# Patient Record
Sex: FEMALE | Race: WHITE | Employment: OTHER | URBAN - METROPOLITAN AREA
[De-identification: names, ages, dates, MRNs, and addresses within clinical notes are randomized per-mention and may not be internally consistent; named-entity substitution may affect disease eponyms.]

---

## 2017-01-12 PROCEDURE — 88305 TISSUE EXAM BY PATHOLOGIST: CPT | Performed by: RADIOLOGY

## 2017-01-16 PROCEDURE — 88305 TISSUE EXAM BY PATHOLOGIST: CPT | Performed by: RADIOLOGY

## 2017-02-07 PROBLEM — D04.30 SQUAMOUS CELL CARCINOMA IN SITU OF SKIN OF FACE: Status: ACTIVE | Noted: 2017-02-07

## 2017-02-07 PROBLEM — N60.99 DUCTAL HYPERPLASIA OF BREAST: Status: ACTIVE | Noted: 2017-02-07

## 2017-05-02 PROBLEM — I67.2 ATHEROSCLEROTIC CEREBROVASCULAR DISEASE: Status: ACTIVE | Noted: 2017-05-02

## 2017-05-02 PROBLEM — M47.816 FACET ARTHRITIS OF LUMBAR REGION: Status: ACTIVE | Noted: 2017-05-02

## 2017-05-02 PROBLEM — E65: Status: ACTIVE | Noted: 2017-05-02

## 2017-05-02 PROBLEM — D04.30 SQUAMOUS CELL CARCINOMA IN SITU OF SKIN OF FACE: Status: RESOLVED | Noted: 2017-02-07 | Resolved: 2017-05-02

## 2017-05-16 PROBLEM — M76.891 HIP TENDONITIS, RIGHT: Status: ACTIVE | Noted: 2017-05-16

## 2017-07-20 PROCEDURE — 81003 URINALYSIS AUTO W/O SCOPE: CPT | Performed by: INTERNAL MEDICINE

## 2017-10-02 PROBLEM — I49.3 FREQUENT PVCS: Status: ACTIVE | Noted: 2017-10-02

## 2017-10-02 PROBLEM — I47.1 PAROXYSMAL SVT (SUPRAVENTRICULAR TACHYCARDIA) (HCC): Status: ACTIVE | Noted: 2017-10-02

## 2017-10-02 PROBLEM — I47.10 PAROXYSMAL SVT (SUPRAVENTRICULAR TACHYCARDIA) (HCC): Status: ACTIVE | Noted: 2017-10-02

## 2017-10-02 PROBLEM — I49.1 PAC (PREMATURE ATRIAL CONTRACTION): Status: ACTIVE | Noted: 2017-10-02

## 2017-10-13 PROBLEM — H81.12 BENIGN PAROXYSMAL VERTIGO OF LEFT EAR: Status: ACTIVE | Noted: 2017-10-13

## 2017-10-31 PROBLEM — I50.32 CHRONIC DIASTOLIC HEART FAILURE (HCC): Status: ACTIVE | Noted: 2017-10-31

## 2018-02-06 PROBLEM — R73.01 IFG (IMPAIRED FASTING GLUCOSE): Status: ACTIVE | Noted: 2018-02-06

## 2018-02-06 PROBLEM — M76.891 HIP TENDONITIS, RIGHT: Status: RESOLVED | Noted: 2017-05-16 | Resolved: 2018-02-06

## 2018-02-06 PROBLEM — R73.01 IFG (IMPAIRED FASTING GLUCOSE): Status: RESOLVED | Noted: 2018-02-06 | Resolved: 2018-02-06

## 2018-02-06 PROBLEM — E65: Status: RESOLVED | Noted: 2017-05-02 | Resolved: 2018-02-06

## 2018-02-06 PROCEDURE — 81003 URINALYSIS AUTO W/O SCOPE: CPT | Performed by: INTERNAL MEDICINE

## 2018-02-06 PROCEDURE — 87086 URINE CULTURE/COLONY COUNT: CPT | Performed by: INTERNAL MEDICINE

## 2018-02-07 PROBLEM — H81.12 BENIGN PAROXYSMAL VERTIGO OF LEFT EAR: Status: RESOLVED | Noted: 2017-10-13 | Resolved: 2018-02-07

## 2018-04-23 PROCEDURE — 81003 URINALYSIS AUTO W/O SCOPE: CPT | Performed by: UROLOGY

## 2018-05-24 PROBLEM — M47.816 FACET ARTHRITIS OF LUMBAR REGION: Status: RESOLVED | Noted: 2017-05-02 | Resolved: 2018-05-24

## 2018-09-24 PROBLEM — F33.0 DEPRESSION, MAJOR, RECURRENT, MILD: Status: ACTIVE | Noted: 2018-09-24

## 2018-09-24 PROBLEM — F33.0 DEPRESSION, MAJOR, RECURRENT, MILD (HCC): Status: ACTIVE | Noted: 2018-09-24

## 2018-10-10 PROCEDURE — 88305 TISSUE EXAM BY PATHOLOGIST: CPT | Performed by: RADIOLOGY

## 2018-10-10 PROCEDURE — 88341 IMHCHEM/IMCYTCHM EA ADD ANTB: CPT | Performed by: RADIOLOGY

## 2018-10-10 PROCEDURE — 88342 IMHCHEM/IMCYTCHM 1ST ANTB: CPT | Performed by: RADIOLOGY

## 2018-11-14 PROBLEM — H43.811 VITREOUS DETACHMENT, RIGHT: Status: ACTIVE | Noted: 2018-11-14

## 2018-11-27 PROCEDURE — 81001 URINALYSIS AUTO W/SCOPE: CPT | Performed by: INTERNAL MEDICINE

## 2018-11-27 PROCEDURE — 87086 URINE CULTURE/COLONY COUNT: CPT | Performed by: INTERNAL MEDICINE

## 2019-03-20 PROBLEM — E65: Status: RESOLVED | Noted: 2017-05-02 | Resolved: 2019-03-20

## 2019-03-20 PROBLEM — I70.0 ATHEROSCLEROSIS OF AORTIC BIFURCATION AND COMMON ILIAC ARTERIES (HCC): Status: ACTIVE | Noted: 2019-03-20

## 2019-03-20 PROBLEM — I70.8 ATHEROSCLEROSIS OF AORTIC BIFURCATION AND COMMON ILIAC ARTERIES (HCC): Status: ACTIVE | Noted: 2019-03-20

## 2019-03-20 PROBLEM — I70.0 ATHEROSCLEROSIS OF AORTIC BIFURCATION AND COMMON ILIAC ARTERIES: Status: ACTIVE | Noted: 2019-03-20

## 2019-03-20 PROBLEM — I70.8 ATHEROSCLEROSIS OF AORTIC BIFURCATION AND COMMON ILIAC ARTERIES: Status: ACTIVE | Noted: 2019-03-20

## 2019-11-30 PROBLEM — I70.0 AORTO-ILIAC ATHEROSCLEROSIS (HCC): Status: ACTIVE | Noted: 2019-11-30

## 2019-11-30 PROBLEM — I77.819 AORTIC ECTASIA (HCC): Status: ACTIVE | Noted: 2019-11-30

## 2019-11-30 PROBLEM — I70.8 AORTO-ILIAC ATHEROSCLEROSIS (HCC): Status: ACTIVE | Noted: 2019-11-30

## 2019-11-30 PROBLEM — I70.0 AORTO-ILIAC ATHEROSCLEROSIS: Status: ACTIVE | Noted: 2019-11-30

## 2019-11-30 PROBLEM — I70.8 AORTO-ILIAC ATHEROSCLEROSIS: Status: ACTIVE | Noted: 2019-11-30

## 2019-11-30 PROBLEM — I77.819 AORTIC ECTASIA: Status: ACTIVE | Noted: 2019-11-30

## 2019-12-02 PROBLEM — M12.811 ROTATOR CUFF ARTHROPATHY OF RIGHT SHOULDER: Status: ACTIVE | Noted: 2019-12-02

## 2020-04-02 PROBLEM — I70.0 AORTO-ILIAC ATHEROSCLEROSIS (HCC): Status: RESOLVED | Noted: 2019-11-30 | Resolved: 2020-04-02

## 2020-04-02 PROBLEM — I70.0 AORTO-ILIAC ATHEROSCLEROSIS: Status: RESOLVED | Noted: 2019-11-30 | Resolved: 2020-04-02

## 2020-04-02 PROBLEM — I70.8 AORTO-ILIAC ATHEROSCLEROSIS: Status: RESOLVED | Noted: 2019-11-30 | Resolved: 2020-04-02

## 2020-04-02 PROBLEM — I70.8 AORTO-ILIAC ATHEROSCLEROSIS (HCC): Status: RESOLVED | Noted: 2019-11-30 | Resolved: 2020-04-02

## 2020-05-07 PROBLEM — I73.9 SMALL VESSEL DISEASE: Status: ACTIVE | Noted: 2017-05-02

## 2020-05-07 PROBLEM — R26.89 BALANCE DISORDER: Status: ACTIVE | Noted: 2020-05-07

## 2020-05-07 PROBLEM — I73.9 SMALL VESSEL DISEASE (HCC): Status: ACTIVE | Noted: 2017-05-02

## 2020-05-07 PROBLEM — K21.9 GASTROESOPHAGEAL REFLUX DISEASE WITHOUT ESOPHAGITIS: Status: ACTIVE | Noted: 2020-05-07

## 2020-07-22 PROBLEM — I73.9 PVD (PERIPHERAL VASCULAR DISEASE) (HCC): Status: ACTIVE | Noted: 2020-07-22

## 2020-07-22 PROBLEM — I73.9 PVD (PERIPHERAL VASCULAR DISEASE): Status: ACTIVE | Noted: 2020-07-22

## 2020-08-20 PROBLEM — I67.89 CEREBRAL MICROVASCULAR DISEASE: Status: ACTIVE | Noted: 2020-08-20

## 2020-08-25 PROBLEM — I63.81 LEFT SIDED LACUNAR INFARCTION (HCC): Status: ACTIVE | Noted: 2020-08-25

## 2020-08-25 PROBLEM — I73.9 PAD (PERIPHERAL ARTERY DISEASE): Status: ACTIVE | Noted: 2020-08-25

## 2020-08-25 PROBLEM — I73.9 PAD (PERIPHERAL ARTERY DISEASE) (HCC): Status: ACTIVE | Noted: 2020-08-25

## 2020-08-25 PROBLEM — M17.11 PRIMARY OSTEOARTHRITIS OF RIGHT KNEE: Status: ACTIVE | Noted: 2020-08-25

## 2020-08-25 PROBLEM — I83.93 VARICOSE VEINS OF BOTH LOWER EXTREMITIES, UNSPECIFIED WHETHER COMPLICATED: Status: ACTIVE | Noted: 2020-08-25

## 2020-11-19 PROBLEM — I63.81 LEFT SIDED LACUNAR INFARCTION (HCC): Status: RESOLVED | Noted: 2020-08-25 | Resolved: 2020-11-19

## 2020-11-19 PROBLEM — C76.1 PRIMARY SQUAMOUS CELL CARCINOMA OF CHEST WALL (HCC): Status: ACTIVE | Noted: 2020-11-19

## 2020-11-19 PROBLEM — Z86.73 HISTORY OF STROKE: Status: ACTIVE | Noted: 2020-11-19

## 2021-01-21 PROCEDURE — 88304 TISSUE EXAM BY PATHOLOGIST: CPT | Performed by: SURGERY

## 2021-02-24 PROBLEM — M25.551 RIGHT HIP PAIN: Status: ACTIVE | Noted: 2021-02-24

## 2021-03-23 PROBLEM — Q21.1 PFO (PATENT FORAMEN OVALE): Status: ACTIVE | Noted: 2021-03-23

## 2021-03-23 PROBLEM — Q21.12 PFO (PATENT FORAMEN OVALE) (HCC): Status: ACTIVE | Noted: 2021-03-23

## 2021-03-29 PROBLEM — R07.9 RIGHT-SIDED CHEST PAIN: Status: ACTIVE | Noted: 2021-01-23

## 2021-03-29 PROBLEM — J93.9 PNEUMOTHORAX, RIGHT: Status: ACTIVE | Noted: 2021-01-24

## 2021-03-30 ENCOUNTER — LAB ENCOUNTER (OUTPATIENT)
Dept: LAB | Age: 81
End: 2021-03-30
Attending: ORTHOPAEDIC SURGERY
Payer: MEDICARE

## 2021-03-30 DIAGNOSIS — Z01.818 PREOPERATIVE TESTING: ICD-10-CM

## 2021-03-30 PROCEDURE — 86901 BLOOD TYPING SEROLOGIC RH(D): CPT

## 2021-03-30 PROCEDURE — 36415 COLL VENOUS BLD VENIPUNCTURE: CPT

## 2021-03-30 PROCEDURE — 86900 BLOOD TYPING SEROLOGIC ABO: CPT

## 2021-03-30 PROCEDURE — 86850 RBC ANTIBODY SCREEN: CPT

## 2021-03-30 NOTE — H&P
[facility]    PATIENT'S NAME: SUNNY Georges   ATTENDING PHYSICIAN: Herbert Patel MD   PATIENT ACCOUNT#:   [id number]    LOCATION:    MEDICAL RECORD #:   [id number]       YOB: 1940  ADMISSION DATE:       04/01/2021    HIST Blood pressure 126/74, pulse 72. Weight is 190 pounds. Height 5 feet 2 inches. BMI of 35. HEENT:  Age appropriate, within normal limits. LUNGS:  Clear to auscultation and percussion. HEART:  Regular rate and rhythm.   Normal S1, S2, without murmurs or

## 2021-04-01 ENCOUNTER — ANESTHESIA (OUTPATIENT)
Dept: SURGERY | Facility: HOSPITAL | Age: 81
End: 2021-04-01
Payer: MEDICARE

## 2021-04-01 ENCOUNTER — ANESTHESIA EVENT (OUTPATIENT)
Dept: SURGERY | Facility: HOSPITAL | Age: 81
End: 2021-04-01
Payer: MEDICARE

## 2021-04-01 ENCOUNTER — APPOINTMENT (OUTPATIENT)
Dept: GENERAL RADIOLOGY | Facility: HOSPITAL | Age: 81
End: 2021-04-01
Attending: ORTHOPAEDIC SURGERY
Payer: MEDICARE

## 2021-04-01 ENCOUNTER — HOSPITAL ENCOUNTER (OUTPATIENT)
Facility: HOSPITAL | Age: 81
Discharge: HOME HEALTH | End: 2021-04-03
Attending: ORTHOPAEDIC SURGERY | Admitting: ORTHOPAEDIC SURGERY
Payer: MEDICARE

## 2021-04-01 DIAGNOSIS — M16.11 PRIMARY OSTEOARTHRITIS OF RIGHT HIP: ICD-10-CM

## 2021-04-01 DIAGNOSIS — Z01.818 PREOPERATIVE TESTING: Primary | ICD-10-CM

## 2021-04-01 PROCEDURE — 97116 GAIT TRAINING THERAPY: CPT

## 2021-04-01 PROCEDURE — 0SR90JZ REPLACEMENT OF RIGHT HIP JOINT WITH SYNTHETIC SUBSTITUTE, OPEN APPROACH: ICD-10-PCS | Performed by: ORTHOPAEDIC SURGERY

## 2021-04-01 PROCEDURE — 73501 X-RAY EXAM HIP UNI 1 VIEW: CPT | Performed by: ORTHOPAEDIC SURGERY

## 2021-04-01 PROCEDURE — 97161 PT EVAL LOW COMPLEX 20 MIN: CPT

## 2021-04-01 PROCEDURE — 97110 THERAPEUTIC EXERCISES: CPT

## 2021-04-01 PROCEDURE — 88311 DECALCIFY TISSUE: CPT | Performed by: ORTHOPAEDIC SURGERY

## 2021-04-01 PROCEDURE — 97530 THERAPEUTIC ACTIVITIES: CPT

## 2021-04-01 PROCEDURE — 88305 TISSUE EXAM BY PATHOLOGIST: CPT | Performed by: ORTHOPAEDIC SURGERY

## 2021-04-01 DEVICE — BIOLOX® DELTA, CERAMIC FEMORAL HEAD, S, Ø 36/-3.5, TAPER 12/14
Type: IMPLANTABLE DEVICE | Site: HIP | Status: FUNCTIONAL
Brand: BIOLOX® DELTA

## 2021-04-01 DEVICE — M/L TAP 9 EXT: Type: IMPLANTABLE DEVICE | Site: HIP | Status: FUNCTIONAL

## 2021-04-01 RX ORDER — ACETAMINOPHEN 325 MG/1
650 TABLET ORAL EVERY 4 HOURS PRN
Status: DISCONTINUED | OUTPATIENT
Start: 2021-04-01 | End: 2021-04-03

## 2021-04-01 RX ORDER — MORPHINE SULFATE 4 MG/ML
4 INJECTION, SOLUTION INTRAMUSCULAR; INTRAVENOUS EVERY 10 MIN PRN
Status: DISCONTINUED | OUTPATIENT
Start: 2021-04-01 | End: 2021-04-01 | Stop reason: HOSPADM

## 2021-04-01 RX ORDER — LIDOCAINE HYDROCHLORIDE 10 MG/ML
INJECTION, SOLUTION INFILTRATION; PERINEURAL
Status: COMPLETED | OUTPATIENT
Start: 2021-04-01 | End: 2021-04-01

## 2021-04-01 RX ORDER — DOCUSATE SODIUM 100 MG/1
100 CAPSULE, LIQUID FILLED ORAL 2 TIMES DAILY
Status: DISCONTINUED | OUTPATIENT
Start: 2021-04-01 | End: 2021-04-03

## 2021-04-01 RX ORDER — EPHEDRINE SULFATE 50 MG/ML
INJECTION INTRAVENOUS AS NEEDED
Status: DISCONTINUED | OUTPATIENT
Start: 2021-04-01 | End: 2021-04-01 | Stop reason: SURG

## 2021-04-01 RX ORDER — ONDANSETRON 2 MG/ML
INJECTION INTRAMUSCULAR; INTRAVENOUS AS NEEDED
Status: DISCONTINUED | OUTPATIENT
Start: 2021-04-01 | End: 2021-04-01 | Stop reason: SURG

## 2021-04-01 RX ORDER — METOCLOPRAMIDE 10 MG/1
10 TABLET ORAL ONCE
Status: COMPLETED | OUTPATIENT
Start: 2021-04-01 | End: 2021-04-01

## 2021-04-01 RX ORDER — FAMOTIDINE 20 MG/1
20 TABLET ORAL ONCE
Status: COMPLETED | OUTPATIENT
Start: 2021-04-01 | End: 2021-04-01

## 2021-04-01 RX ORDER — CEFAZOLIN SODIUM/WATER 2 G/20 ML
2 SYRINGE (ML) INTRAVENOUS EVERY 8 HOURS
Status: COMPLETED | OUTPATIENT
Start: 2021-04-01 | End: 2021-04-02

## 2021-04-01 RX ORDER — HYDROCODONE BITARTRATE AND ACETAMINOPHEN 5; 325 MG/1; MG/1
1 TABLET ORAL AS NEEDED
Status: DISCONTINUED | OUTPATIENT
Start: 2021-04-01 | End: 2021-04-01 | Stop reason: HOSPADM

## 2021-04-01 RX ORDER — HYDROCODONE BITARTRATE AND ACETAMINOPHEN 10; 325 MG/1; MG/1
2 TABLET ORAL EVERY 4 HOURS PRN
Status: DISCONTINUED | OUTPATIENT
Start: 2021-04-01 | End: 2021-04-03

## 2021-04-01 RX ORDER — DIPHENHYDRAMINE HCL 25 MG
25 CAPSULE ORAL EVERY 4 HOURS PRN
Status: DISCONTINUED | OUTPATIENT
Start: 2021-04-01 | End: 2021-04-03

## 2021-04-01 RX ORDER — MORPHINE SULFATE 10 MG/ML
6 INJECTION, SOLUTION INTRAMUSCULAR; INTRAVENOUS EVERY 10 MIN PRN
Status: DISCONTINUED | OUTPATIENT
Start: 2021-04-01 | End: 2021-04-01 | Stop reason: HOSPADM

## 2021-04-01 RX ORDER — HYDROMORPHONE HYDROCHLORIDE 1 MG/ML
0.6 INJECTION, SOLUTION INTRAMUSCULAR; INTRAVENOUS; SUBCUTANEOUS EVERY 5 MIN PRN
Status: DISCONTINUED | OUTPATIENT
Start: 2021-04-01 | End: 2021-04-01 | Stop reason: HOSPADM

## 2021-04-01 RX ORDER — HYDROMORPHONE HYDROCHLORIDE 1 MG/ML
0.4 INJECTION, SOLUTION INTRAMUSCULAR; INTRAVENOUS; SUBCUTANEOUS EVERY 5 MIN PRN
Status: DISCONTINUED | OUTPATIENT
Start: 2021-04-01 | End: 2021-04-01 | Stop reason: HOSPADM

## 2021-04-01 RX ORDER — MORPHINE SULFATE 4 MG/ML
2 INJECTION, SOLUTION INTRAMUSCULAR; INTRAVENOUS EVERY 10 MIN PRN
Status: DISCONTINUED | OUTPATIENT
Start: 2021-04-01 | End: 2021-04-01 | Stop reason: HOSPADM

## 2021-04-01 RX ORDER — SODIUM CHLORIDE 9 MG/ML
INJECTION, SOLUTION INTRAVENOUS CONTINUOUS
Status: DISCONTINUED | OUTPATIENT
Start: 2021-04-01 | End: 2021-04-02

## 2021-04-01 RX ORDER — HYDROMORPHONE HYDROCHLORIDE 1 MG/ML
0.4 INJECTION, SOLUTION INTRAMUSCULAR; INTRAVENOUS; SUBCUTANEOUS EVERY 2 HOUR PRN
Status: DISCONTINUED | OUTPATIENT
Start: 2021-04-01 | End: 2021-04-02

## 2021-04-01 RX ORDER — ACETAMINOPHEN 500 MG
1000 TABLET ORAL ONCE
Status: DISCONTINUED | OUTPATIENT
Start: 2021-04-01 | End: 2021-04-01 | Stop reason: HOSPADM

## 2021-04-01 RX ORDER — CEFAZOLIN SODIUM/WATER 2 G/20 ML
2 SYRINGE (ML) INTRAVENOUS ONCE
Status: COMPLETED | OUTPATIENT
Start: 2021-04-01 | End: 2021-04-01

## 2021-04-01 RX ORDER — ONDANSETRON 2 MG/ML
4 INJECTION INTRAMUSCULAR; INTRAVENOUS EVERY 4 HOURS PRN
Status: DISCONTINUED | OUTPATIENT
Start: 2021-04-01 | End: 2021-04-03

## 2021-04-01 RX ORDER — GABAPENTIN 300 MG/1
300 CAPSULE ORAL EVERY MORNING
Status: DISCONTINUED | OUTPATIENT
Start: 2021-04-01 | End: 2021-04-03

## 2021-04-01 RX ORDER — DIPHENHYDRAMINE HYDROCHLORIDE 50 MG/ML
12.5 INJECTION INTRAMUSCULAR; INTRAVENOUS EVERY 4 HOURS PRN
Status: DISCONTINUED | OUTPATIENT
Start: 2021-04-01 | End: 2021-04-03

## 2021-04-01 RX ORDER — BISACODYL 10 MG
10 SUPPOSITORY, RECTAL RECTAL
Status: DISCONTINUED | OUTPATIENT
Start: 2021-04-01 | End: 2021-04-03

## 2021-04-01 RX ORDER — ONDANSETRON 2 MG/ML
4 INJECTION INTRAMUSCULAR; INTRAVENOUS ONCE AS NEEDED
Status: DISCONTINUED | OUTPATIENT
Start: 2021-04-01 | End: 2021-04-01 | Stop reason: HOSPADM

## 2021-04-01 RX ORDER — NALOXONE HYDROCHLORIDE 0.4 MG/ML
80 INJECTION, SOLUTION INTRAMUSCULAR; INTRAVENOUS; SUBCUTANEOUS AS NEEDED
Status: DISCONTINUED | OUTPATIENT
Start: 2021-04-01 | End: 2021-04-01 | Stop reason: HOSPADM

## 2021-04-01 RX ORDER — PROCHLORPERAZINE EDISYLATE 5 MG/ML
5 INJECTION INTRAMUSCULAR; INTRAVENOUS ONCE AS NEEDED
Status: DISCONTINUED | OUTPATIENT
Start: 2021-04-01 | End: 2021-04-01 | Stop reason: HOSPADM

## 2021-04-01 RX ORDER — HYDROCODONE BITARTRATE AND ACETAMINOPHEN 10; 325 MG/1; MG/1
1 TABLET ORAL EVERY 4 HOURS PRN
Status: DISCONTINUED | OUTPATIENT
Start: 2021-04-01 | End: 2021-04-03

## 2021-04-01 RX ORDER — HYDROMORPHONE HYDROCHLORIDE 1 MG/ML
0.2 INJECTION, SOLUTION INTRAMUSCULAR; INTRAVENOUS; SUBCUTANEOUS EVERY 2 HOUR PRN
Status: DISCONTINUED | OUTPATIENT
Start: 2021-04-01 | End: 2021-04-03

## 2021-04-01 RX ORDER — SODIUM PHOSPHATE, DIBASIC AND SODIUM PHOSPHATE, MONOBASIC 7; 19 G/133ML; G/133ML
1 ENEMA RECTAL ONCE AS NEEDED
Status: DISCONTINUED | OUTPATIENT
Start: 2021-04-01 | End: 2021-04-03

## 2021-04-01 RX ORDER — SENNOSIDES 8.6 MG
17.2 TABLET ORAL NIGHTLY
Status: DISCONTINUED | OUTPATIENT
Start: 2021-04-01 | End: 2021-04-03

## 2021-04-01 RX ORDER — HYDROMORPHONE HYDROCHLORIDE 1 MG/ML
0.8 INJECTION, SOLUTION INTRAMUSCULAR; INTRAVENOUS; SUBCUTANEOUS EVERY 2 HOUR PRN
Status: DISCONTINUED | OUTPATIENT
Start: 2021-04-01 | End: 2021-04-02

## 2021-04-01 RX ORDER — DIPHENHYDRAMINE HYDROCHLORIDE 50 MG/ML
25 INJECTION INTRAMUSCULAR; INTRAVENOUS ONCE AS NEEDED
Status: ACTIVE | OUTPATIENT
Start: 2021-04-01 | End: 2021-04-01

## 2021-04-01 RX ORDER — SODIUM CHLORIDE, SODIUM LACTATE, POTASSIUM CHLORIDE, CALCIUM CHLORIDE 600; 310; 30; 20 MG/100ML; MG/100ML; MG/100ML; MG/100ML
INJECTION, SOLUTION INTRAVENOUS CONTINUOUS
Status: DISCONTINUED | OUTPATIENT
Start: 2021-04-01 | End: 2021-04-01 | Stop reason: HOSPADM

## 2021-04-01 RX ORDER — AMLODIPINE BESYLATE 5 MG/1
5 TABLET ORAL 2 TIMES DAILY
Status: DISCONTINUED | OUTPATIENT
Start: 2021-04-01 | End: 2021-04-03

## 2021-04-01 RX ORDER — SODIUM CHLORIDE, SODIUM LACTATE, POTASSIUM CHLORIDE, CALCIUM CHLORIDE 600; 310; 30; 20 MG/100ML; MG/100ML; MG/100ML; MG/100ML
INJECTION, SOLUTION INTRAVENOUS CONTINUOUS
Status: DISCONTINUED | OUTPATIENT
Start: 2021-04-01 | End: 2021-04-03

## 2021-04-01 RX ORDER — HYDROMORPHONE HYDROCHLORIDE 1 MG/ML
0.2 INJECTION, SOLUTION INTRAMUSCULAR; INTRAVENOUS; SUBCUTANEOUS EVERY 5 MIN PRN
Status: DISCONTINUED | OUTPATIENT
Start: 2021-04-01 | End: 2021-04-01 | Stop reason: HOSPADM

## 2021-04-01 RX ORDER — ACETAMINOPHEN 500 MG
500 TABLET ORAL EVERY 6 HOURS PRN
COMMUNITY
End: 2021-09-09 | Stop reason: ALTCHOICE

## 2021-04-01 RX ORDER — ASPIRIN 325 MG
325 TABLET ORAL 2 TIMES DAILY
Status: DISCONTINUED | OUTPATIENT
Start: 2021-04-02 | End: 2021-04-03

## 2021-04-01 RX ORDER — HALOPERIDOL 5 MG/ML
0.25 INJECTION INTRAMUSCULAR ONCE AS NEEDED
Status: DISCONTINUED | OUTPATIENT
Start: 2021-04-01 | End: 2021-04-01 | Stop reason: HOSPADM

## 2021-04-01 RX ORDER — CELECOXIB 200 MG/1
200 CAPSULE ORAL EVERY 12 HOURS SCHEDULED
Status: DISCONTINUED | OUTPATIENT
Start: 2021-04-02 | End: 2021-04-03

## 2021-04-01 RX ORDER — PROCHLORPERAZINE EDISYLATE 5 MG/ML
10 INJECTION INTRAMUSCULAR; INTRAVENOUS EVERY 6 HOURS PRN
Status: DISCONTINUED | OUTPATIENT
Start: 2021-04-01 | End: 2021-04-03

## 2021-04-01 RX ORDER — MAGNESIUM HYDROXIDE 1200 MG/15ML
LIQUID ORAL CONTINUOUS PRN
Status: COMPLETED | OUTPATIENT
Start: 2021-04-01 | End: 2021-04-01

## 2021-04-01 RX ORDER — POLYETHYLENE GLYCOL 3350 17 G/17G
17 POWDER, FOR SOLUTION ORAL DAILY PRN
Status: DISCONTINUED | OUTPATIENT
Start: 2021-04-01 | End: 2021-04-03

## 2021-04-01 RX ORDER — HYDROCODONE BITARTRATE AND ACETAMINOPHEN 5; 325 MG/1; MG/1
2 TABLET ORAL AS NEEDED
Status: DISCONTINUED | OUTPATIENT
Start: 2021-04-01 | End: 2021-04-01 | Stop reason: HOSPADM

## 2021-04-01 RX ORDER — BUPIVACAINE HYDROCHLORIDE 7.5 MG/ML
INJECTION, SOLUTION INTRASPINAL
Status: COMPLETED | OUTPATIENT
Start: 2021-04-01 | End: 2021-04-01

## 2021-04-01 RX ADMIN — ONDANSETRON 4 MG: 2 INJECTION INTRAMUSCULAR; INTRAVENOUS at 11:06:00

## 2021-04-01 RX ADMIN — LIDOCAINE HYDROCHLORIDE 5 ML: 10 INJECTION, SOLUTION INFILTRATION; PERINEURAL at 09:47:00

## 2021-04-01 RX ADMIN — EPHEDRINE SULFATE 10 MG: 50 INJECTION INTRAVENOUS at 10:17:00

## 2021-04-01 RX ADMIN — CEFAZOLIN SODIUM/WATER 2 G: 2 G/20 ML SYRINGE (ML) INTRAVENOUS at 09:44:00

## 2021-04-01 RX ADMIN — BUPIVACAINE HYDROCHLORIDE 1.6 ML: 7.5 INJECTION, SOLUTION INTRASPINAL at 09:47:00

## 2021-04-01 NOTE — ANESTHESIA PREPROCEDURE EVALUATION
Anesthesia PreOp Note    HPI:     Liliana Juan is a [de-identified]year old female who presents for preoperative consultation requested by: Yee Bobby MD    Date of Surgery: 4/1/2021    Procedure(s):  RIGHT TOTAL HIP ARTHROPLASTY  Indication: Primary os (premature atrial contraction)         Date Noted: 10/02/2017      Frequent PVCs         Date Noted: 10/02/2017      Paroxysmal SVT (supraventricular tachycardia) ([address]         Date Noted: 10/02/2017      Ductal hyperplasia of breast         Date Noted: 02 Gastroesophageal reflux disease without esophagitis- seen on esophogram  5/7/2020   • Glucose intolerance (impaired glucose tolerance) 5/31/2013   • Hearing impairment     radha hearing aids   • Hearing loss    • High blood pressure    • History of breast ca Performed by Roscoe Medley MD at [address] [place] [address]   • HX BREAST CANCER  9-04   • LAPAROSCOPIC CHOLECYSTECTOMY WITH  CHOLANGIOGRAM   POSS OPEN N/A 1/21/2021    Performed by Ellis Martin MD at [address] [place] [address]   • LUMPECTOMY LEFT  9-04 Oral Tab, TAKE 1/2 TABLET BY MOUTH TWICE DAILY, Disp: 90 tablet, Rfl: 3, 4/1/2021 at 0715  Multiple Vitamins-Minerals (MULTIVITAMIN ADULT OR), Take by mouth daily. , Disp: , Rfl: , 3/23/2021  VITAMIN D 2000 UNIT OR TABS, 1 TABLET DAILY, Disp: , Rfl: , 3/28/ Marital status:       Spouse name: Not on file      Number of children: Not on file      Years of education: Not on file      Highest education level: Not on file    Occupational History      Not on file    Tobacco Use      Smoking status: Never Smo Component Value Date    WBC 7.17 03/23/2021    RBC 4.34 03/23/2021    HGB 13.7 03/23/2021    HCT 42.0 03/23/2021    MCV 96.8 03/23/2021    MCH 31.6 03/23/2021    MCHC 32.6 03/23/2021    RDW 12.2 03/23/2021     03/23/2021     Lab Results   Componen desires the anesthetic management as planned.   Arielle Cordova  4/1/2021 9:16 AM

## 2021-04-01 NOTE — INTERVAL H&P NOTE
Pre-op Diagnosis: Primary osteoarthritis of right hip [M16.11]    The above referenced H&P was reviewed by Rylan Desai MD on 4/1/2021, the patient was examined and no significant changes have occurred in the patient's condition since the H&P was per

## 2021-04-01 NOTE — CONSULTS
[facility] Hospitalist Team  Consult   Admit Date:  4/1/21     ASSESSMENT / PLAN:   [de-identified] yo female who is [place] with hx obesity-bmi 40, breast cancer s/p lumpectomy/chemo and radiation, vitamin d def,  Basal cell ca, HTN, SVT, chronic diastolic chf,cataracts, vertigo rashes  HEENT: normocephalic, normal nose, pharynx and TM's, sclera anicteric, conjunctiva normal  NECK: supple  RESPIRATORY: normal expansion; non labored, CTA   CARDIOVASCULAR: regular,nl S1 S2  ABDOMEN:  Soft, BS+; non distended, non tender    GENITAL/G loss) 6/3/2014   • Squamous cell carcinoma in situ of skin of face 2/7/2017   • Stroke [facility])     possible \"small stroke\" per patient, date unknown   • Subclinical hypothyroidism    • Thyroid nodule 7/8/2014   • Varicose veins of legs 4/2/2015   • Andrez Galeana    • UMBILICAL HERNIORRAPHY N/A 6/5/2014    Performed by Mika Morales MD at [facility]        ALL:    Adhesive Tape           RASH     Home Medications:  acetaminophen 500 MG Oral Tab, Take 500 mg by mouth every 6 (six) hours as hours. No results for input(s): ALT, AST, ALB, AMYLASE, LIPASE, LDH in the last 168 hours. Invalid input(s): ALPHOS, TBIL, DBIL, TPROT    No results for input(s): TROP in the last 168 hours.     Radiology: XR HIP W OR WO PELVIS 1 VIEW, RIGHT (CPT=7350 as above with above

## 2021-04-01 NOTE — ANESTHESIA POSTPROCEDURE EVALUATION
Patient: Davina Ribera    Procedure Summary     Date: 04/01/21 Room / Location: [address] [place] [address] MAIN OR 12 / [address] [place] [address] MAIN OR    Anesthesia Start: 8953 Anesthesia Stop: 3425    Procedure: RIGHT TOTAL HIP ARTHROPLASTY (Right Hip) Diagnosis:       Primary osteoarthritis

## 2021-04-01 NOTE — ANESTHESIA PROCEDURE NOTES
Spinal Block    Date/Time: 4/1/2021 9:47 AM  Performed by: Lorraine De Anda MD  Authorized by: Lorraine De Anda MD       General Information and Staff    Start Time:  4/1/2021 9:25 AM  End Time:  4/1/2021 9:35 AM  Anesthesiologist:  Lorraine De Anda MD  Perfo

## 2021-04-01 NOTE — PLAN OF CARE
Problem: Patient Centered Care  Goal: Patient preferences are identified and integrated in the patient's plan of care  Description: Interventions:  - What would you like us to know as we care for you?  LEFT ARM PRECAUTIONS  - Provide timely, complete, and during anticipated neutropenic period  Description: INTERVENTIONS  - Monitor WBC  - Administer growth factors as ordered  - Implement neutropenic guidelines  Outcome: Progressing     Problem: SAFETY ADULT - FALL  Goal: Free from fall injury  Description: I ordered  - Obtain nutritional consult as needed  - Evaluate fluid balance  Outcome: Progressing  Goal: Maintains or returns to baseline bowel function  Description: INTERVENTIONS:  - Assess bowel function  - Maintain adequate hydration with IV or PO as ord hemorrhage  - Monitor labs and vital signs for trends  - Administer supportive blood products/factors, fluids and medications as ordered and appropriate  - Administer supportive blood products/factors as ordered and appropriate  Outcome: Progressing  Goal:

## 2021-04-01 NOTE — PHYSICAL THERAPY NOTE
PHYSICAL THERAPY HIP EVALUATION - INPATIENT     Room Number: 434/434-A  Evaluation Date: 4/1/2021  Type of Evaluation: Initial  Physician Order: PT Eval and Treat    Presenting Problem: R LYNNE, posterior approach  Reason for Therapy: Mobility Dysfunction an assist and continued acute PT. PT recommendation home with initial 24 hr supervision, HHPT at d/c. Patient will benefit from continued IP PT services to address these deficits in preparation for discharge.     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 Noted on CT 12/12/12.     • Severe obesity ( Cysto- Dr. Anh Daley   • RADIATION LEFT  10-04   • SKIN SURGERY  02/14/2012    BCC nodular / L infraorbital cheek / Mohs surgery by Dr. Sunny Georges   • [place]  03/21/17    MMS done for [facility] mod well differentiated to right mid cheek done by AB   • SKIN COE -  Dynamic Standing: Fair -    ACTIVITY TOLERANCE  Pre-activity, supine:  SPO2 98% on room air  HR 65 bpm  BP RUE 96/60 mmHg    Post-activity, seated:  SPO2 98% on room air   bpm  BP /86 mmHg  *no dizziness, nausea, etc with mobility    AM-PAC GOALS    Goals to be met by: 4/8/21  Patient Goal Patient's self-stated goal is: to be able to walk without hip pain   Goal #1 Patient is able to demonstrate supine - sit EOB @ level: modified independent   Goal #1   Current Status    Goal #2 Patient is ab

## 2021-04-01 NOTE — CM/SW NOTE
04/01/21 1500   CM/SW Referral Data   Referral Source Physician   Reason for Referral Discharge planning   Informant Patient   Patient Info   Patient's Mental Status Alert;Oriented   Patient's [address]   Number of Levels in Home 2   Number

## 2021-04-02 PROCEDURE — 97530 THERAPEUTIC ACTIVITIES: CPT

## 2021-04-02 PROCEDURE — 85018 HEMOGLOBIN: CPT | Performed by: PHYSICIAN ASSISTANT

## 2021-04-02 PROCEDURE — 80048 BASIC METABOLIC PNL TOTAL CA: CPT | Performed by: NURSE PRACTITIONER

## 2021-04-02 PROCEDURE — 97116 GAIT TRAINING THERAPY: CPT

## 2021-04-02 PROCEDURE — 97166 OT EVAL MOD COMPLEX 45 MIN: CPT

## 2021-04-02 PROCEDURE — 97110 THERAPEUTIC EXERCISES: CPT

## 2021-04-02 PROCEDURE — 97535 SELF CARE MNGMENT TRAINING: CPT

## 2021-04-02 PROCEDURE — 85014 HEMATOCRIT: CPT | Performed by: PHYSICIAN ASSISTANT

## 2021-04-02 RX ORDER — TRAMADOL HYDROCHLORIDE 50 MG/1
50 TABLET ORAL EVERY 6 HOURS PRN
Status: DISCONTINUED | OUTPATIENT
Start: 2021-04-02 | End: 2021-04-03

## 2021-04-02 NOTE — OPERATIVE REPORT
[facility]    PATIENT'S NAME: SUNNY Garrett   ATTENDING PHYSICIAN: Herbert Ferrer MD   OPERATING PHYSICIAN: Herbert Ferrer MD   PATIENT ACCOUNT#:   [id number]    LOCATION:  [address] [place] [address] #:   [id number]       DATE superolateral position with all pressure points well padded and the pelvis well stabilized. Preoperative antibiotics were introduced. Time-out was performed.   The procedure was begun after the leg was sterilely prepped and draped, and a 10 cm incision fo to the femoral neck. A box osteotome created the appropriate version followed by canal finder followed by lateral reaming. Serial broaching was taken up to a size 9 where good cortical contact was achieved. No calcar planing was necessary.   A series of

## 2021-04-02 NOTE — PROGRESS NOTES
POD#1 s/p Right LYNNE  No Nausea  Pain well controlled on orals  Ocampo out    Xray reviewed  Lab with mild post op acute anemia - tolerated    Dressing clean and dry  Calfs non tender, DNVI    Imp: POD#1 s/p Right LYNNE - ortho stable    Plan:  BID for

## 2021-04-02 NOTE — PLAN OF CARE
Problem: Patient Centered Care  Goal: Patient preferences are identified and integrated in the patient's plan of care  Description: Interventions:  - What would you like us to know as we care for you?   WILL ASSIST ME AT HOME  - Provide timely, com fever/infection during anticipated neutropenic period  Description: INTERVENTIONS  - Monitor WBC  - Administer growth factors as ordered  - Implement neutropenic guidelines  Outcome: Progressing     Problem: SAFETY ADULT - FALL  Goal: Free from fall injury as tolerated, if ordered  - Obtain nutritional consult as needed  - Evaluate fluid balance  Outcome: Progressing  Goal: Maintains or returns to baseline bowel function  Description: INTERVENTIONS:  - Assess bowel function  - Maintain adequate hydration wit bleeding or hemorrhage  - Monitor labs and vital signs for trends  - Administer supportive blood products/factors, fluids and medications as ordered and appropriate  - Administer supportive blood products/factors as ordered and appropriate  Outcome: Cecilia

## 2021-04-02 NOTE — OCCUPATIONAL THERAPY NOTE
OCCUPATIONAL THERAPY EVALUATION - INPATIENT      Room Number: 434/434-A  Evaluation Date: 4/2/2021  Type of Evaluation: Initial       Physician Order: IP Consult to Occupational Therapy  Reason for Therapy: ADL/IADL Dysfunction and Discharge Planning    OC of impairment may benefit from [facility]. Anticipate pt will progress towards IP OT goals and demonstrate the physical skills required to return home with home health therapy for home safety evaluation and assist from family.        DISCHARGE RECOMMENDATIONS ([facility] 10/15/2015   • Osteoarthritis, hip, bilateral     seen on CT pelvis, moderate   • Peripheral neuropathy 6/5/2012   • Personal history of antineoplastic chemotherapy    • Primary squamous cell carcinoma of chest wall ([facility] 7/6/2016   • Pulmonary scarr SURGICAL HISTORY      lumpectomy   • OTHER SURGICAL HISTORY  05/22/2018    Cysto- Dr. Cartagena Grade   • RADIATION LEFT  10-04   • RIGHT TOTAL HIP ARTHROPLASTY Right 4/1/2021    Performed by Richi Vega MD at [facility] OR   • SKIN SURGERY  02/14/2012    BCC no lower body clothing?: A Lot  -   Bathing (including washing, rinsing, drying)?: A Little  -   Toileting, which includes using toilet, bedpan or urinal? : A Little  -   Putting on and taking off regular upper body clothing?: A Little  -   Taking care of per

## 2021-04-02 NOTE — PHYSICAL THERAPY NOTE
PHYSICAL THERAPY HIP TREATMENT NOTE - INPATIENT    Room Number: 434/434-A            Presenting Problem: R LYNNE, posterior approach    Problem List  Principal Problem:    Osteoarthritis of right hip      PHYSICAL THERAPY ASSESSMENT     Pt seen BID. PTA wore need...   -   Moving to and from a bed to a chair (including a wheelchair)?: A Little   -   Need to walk in hospital room?: A Little   -   Climbing 3-5 steps with a railing?: A Little     AM-PAC Score:  Raw Score: 17   Approx Degree of Impairment: 50.57%

## 2021-04-02 NOTE — PLAN OF CARE
VSS, no acute events overnight. Pt very hard of hearing despite hearing aides. Norco for pain. Terrence discontinued this AM. Plan to continue to monitor, administer medications prn and as scheduled, follow plan of care.    Problem: Patient Centered Care  Goal cognitive and physical deficits and behaviors that affect risk of falls.   - [place] fall precautions as indicated by assessment.  - Educate pt/family on patient safety including physical limitations  - Instruct pt to call for assistance with activity bas Chest pain Chest pain Chest pain Encourage mobilization and activity  - Obtain nutritional consult as needed  - Establish a toileting routine/schedule  - Consider collaborating with pharmacy to review patient's medication profile  Outcome: Progressing     Problem: GENITOURINARY - ADULT  G Chest pain Chest pain platelets)  INTERVENTIONS:  - Avoid intramuscular injections, enemas and rectal medication administration  - Ensure safe mobilization of patient  - Hold pressure on venipuncture sites to achieve adequate hemostasis  - Assess for signs and symptoms of inter Chest pain Chest pain Chest pain

## 2021-04-02 NOTE — PROGRESS NOTES
[facility] Hospitalist Progress Note     CC: Hospital Follow up    PCP: Casi Villalba MD       Assessment/Plan:     Principal Problem:    Osteoarthritis of right hip    Patient is an [de-identified] yo female who is [place] with hx obesity-bmi 40, breast cancer s/p lumpe 0827 : 193 lb 6.4 oz (87.7 kg)  03/30/21 1235 : 195 lb (88.5 kg)  03/23/21 1113 : 191 lb (86.6 kg)  03/23/21 0900 : 188 lb (85.3 kg)      Exam    Gen: No acute distress, alert and oriented x3,   Heent: NC AT,    Pulm: Lungs clear, normal respiratory effort

## 2021-04-03 VITALS
HEIGHT: 60 IN | WEIGHT: 193.38 LBS | TEMPERATURE: 98 F | DIASTOLIC BLOOD PRESSURE: 55 MMHG | HEART RATE: 71 BPM | SYSTOLIC BLOOD PRESSURE: 109 MMHG | BODY MASS INDEX: 37.97 KG/M2 | RESPIRATION RATE: 16 BRPM | OXYGEN SATURATION: 95 %

## 2021-04-03 PROCEDURE — 97530 THERAPEUTIC ACTIVITIES: CPT

## 2021-04-03 PROCEDURE — 97535 SELF CARE MNGMENT TRAINING: CPT

## 2021-04-03 PROCEDURE — 80048 BASIC METABOLIC PNL TOTAL CA: CPT | Performed by: HOSPITALIST

## 2021-04-03 PROCEDURE — 85025 COMPLETE CBC W/AUTO DIFF WBC: CPT | Performed by: HOSPITALIST

## 2021-04-03 PROCEDURE — 97110 THERAPEUTIC EXERCISES: CPT

## 2021-04-03 PROCEDURE — 97116 GAIT TRAINING THERAPY: CPT

## 2021-04-03 RX ORDER — ASPIRIN 325 MG
325 TABLET ORAL 2 TIMES DAILY
Qty: 56 TABLET | Refills: 0 | Status: SHIPPED | OUTPATIENT
Start: 2021-04-03 | End: 2021-05-01

## 2021-04-03 NOTE — PROGRESS NOTES
POD#2 s/p right LYNNE  Pain well controlled on orals  Rehab progressing well  No nausea    Lab stable H/H    Dressing clean and dry  Calfs non tender, DNVI    Imp: POD#2 s/p Right LYNNE - Ortho stable for DC home with home PT    Plan: 325 ASA for 4 weeks  Home

## 2021-04-03 NOTE — PLAN OF CARE
Pt is aox4, ambulating with walker and one assist, steady gait WBAT. Voiding freely to BR. Scds bilaterally with ASA BID for DVT prophylaxis. Dressing to right hip clean and dry, silver aquacel intact.  Toelrating one norco 10 for pain, denies nausea dizzin Goal  Description: Patient's Short Term Goal: rest, pain control    Interventions:   - cluster care, ice therapy, mobilization  - See additional Care Plan goals for specific interventions  4/3/2021 0454 by Mckenna Caballero RN  Outcome: Progressing  4/3/20 environment to reduce risk of injury  - Provide assistive devices as appropriate  - Consider OT/PT consult to assist with strengthening/mobility  - Encourage toileting schedule  4/3/2021 0454 by Jj Castillo RN  Outcome: Progressing  4/3/2021 0453 by medications  - Encourage mobilization and activity  - Obtain nutritional consult as needed  - Establish a toileting routine/schedule  - Consider collaborating with pharmacy to review patient's medication profile  4/3/2021 7384 by SHARIF Conway stability  Description: INTERVENTIONS  - Assess for signs and symptoms of bleeding or hemorrhage  - Monitor labs and vital signs for trends  - Administer supportive blood products/factors, fluids and medications as ordered and appropriate  - Administer sup

## 2021-04-03 NOTE — DISCHARGE SUMMARY
General Medicine Discharge Summary     Patient ID:  Evangelina Ribera  [de-identified]year old  5/11/1940    Admit date: 4/1/2021    Discharge date and time: 4/3/2021    Attending Physician: Kendy Goff MD     Consults: IP CONSULT TO CASE MANAGEMENT  MARTHA Carvalho review 13.7-> 9.9->9.9  -DVT Prophy- ASA  -PT/OT/SW-HHC  -FU with ortho     HTN  SVT  Chronic Diastolic CHF  -7334 echo with EF 55-60%,diastolic dysfunction, mild tr, cannot r/o pfo  -home meds-lasix, norvsasc and lisinopril  -resume  norvasc  - resume las ofloxacin 0.3 % Soln  Commonly known as: OCUFLOX  Use one drop, four times a day to the eye having cataract surgery, starting after the surgery. Continue for a total of  8 days.      prednisoLONE acetate 1 % Susp  Commonly known as: PRED FORTE  Start one

## 2021-04-03 NOTE — OCCUPATIONAL THERAPY NOTE
OCCUPATIONAL THERAPY TREATMENT NOTE - INPATIENT    Room Number: 434/434-A               Problem List  Principal Problem:    Osteoarthritis of right hip      OCCUPATIONAL THERAPY ASSESSMENT     RN cleared pt for participation in occupational therapy session training;Functional transfer training; Endurance training;Equipment eval/education; Compensatory technique education    SUBJECTIVE  Agreeable to activity     OBJECTIVE  Precautions: Limb alert - right;LYNNE - posterior;Hip abduction pillow    WEIGHT BEARING RE posterior hip precautions  Comment:            Goals  on: 2021  Frequency: 5x/wk

## 2021-04-03 NOTE — PHYSICAL THERAPY NOTE
PHYSICAL THERAPY TREATMENT NOTE - INPATIENT     Room Number: 434/434-A       Presenting Problem: R LYNNE, posterior approach     Problem List  Principal Problem:    Osteoarthritis of right hip      PHYSICAL THERAPY ASSESSMENT   RN approved for therapy alaina hip  Management Techniques: Activity promotion; Body mechanics;Repositioning    BALANCE                                                                                                                     Static Sitting: Good  Dynamic Sitting: Good demonstrate supine - sit EOB @ level: modified independent   Goal #1   Current Status SBA   Goal #2 Patient is able to demonstrate transfers Sit to/from Stand at assistance level: modified independent     Goal #2  Current Status SBA   Goal #3 Patient is ab

## 2021-04-03 NOTE — PLAN OF CARE
Pt A+Ox4, VSS, saturating well on room air, pain controlled with PRN norco. Up with x1 assist and walker. Francois Rosales ordered for pt to take home. Surgical dressing to R hip in place, CDI. Bed in lowest position and locked, call light within reach.  Plan is to d pain  - Anticipate increased pain with activity and pre-medicate as appropriate  Outcome: Adequate for Discharge     Problem: RISK FOR INFECTION - ADULT  Goal: Absence of fever/infection during anticipated neutropenic period  Description: INTERVENTIONS  - tolerated  - Nasogastric tube to low intermittent suction as ordered  - Evaluate effectiveness of ordered antiemetic medications  - Provide nonpharmacologic comfort measures as appropriate  - Advance diet as tolerated, if ordered  - Obtain nutritional cons isolation precautions as appropriate  - Initiate Pressure Ulcer prevention bundle as indicated  Outcome: Adequate for Discharge     Problem: HEMATOLOGIC - ADULT  Goal: Maintains hematologic stability  Description: INTERVENTIONS  - Assess for signs and symp

## 2021-04-20 PROBLEM — N60.99 DUCTAL HYPERPLASIA OF BREAST: Status: RESOLVED | Noted: 2017-02-07 | Resolved: 2021-04-20

## 2021-04-20 PROBLEM — H25.9 AGE-RELATED CATARACT OF BOTH EYES, UNSPECIFIED AGE-RELATED CATARACT TYPE: Status: ACTIVE | Noted: 2021-04-20

## 2021-04-20 PROBLEM — Z96.641 HISTORY OF RIGHT HIP REPLACEMENT: Status: ACTIVE | Noted: 2021-04-20

## 2021-04-20 PROBLEM — Z87.09 HISTORY OF PNEUMOTHORAX: Status: ACTIVE | Noted: 2021-01-24

## 2021-04-20 PROBLEM — R07.9 RIGHT-SIDED CHEST PAIN: Status: RESOLVED | Noted: 2021-01-23 | Resolved: 2021-04-20

## 2021-04-20 PROBLEM — R00.0 SINUS TACHYCARDIA: Status: ACTIVE | Noted: 2021-04-20

## 2021-04-20 PROBLEM — M25.551 RIGHT HIP PAIN: Status: RESOLVED | Noted: 2021-02-24 | Resolved: 2021-04-20

## 2021-07-20 PROBLEM — I50.32 HYPERTENSIVE HEART DISEASE WITH CHRONIC DIASTOLIC CONGESTIVE HEART FAILURE (HCC): Status: ACTIVE | Noted: 2021-07-20

## 2021-07-20 PROBLEM — I11.0 HYPERTENSIVE HEART DISEASE WITH CHRONIC DIASTOLIC CONGESTIVE HEART FAILURE (HCC): Status: ACTIVE | Noted: 2021-07-20

## 2021-07-20 PROBLEM — C76.1 PRIMARY SQUAMOUS CELL CARCINOMA OF CHEST WALL (HCC): Status: RESOLVED | Noted: 2020-11-19 | Resolved: 2021-07-20

## 2021-07-26 PROBLEM — D69.2 SENILE PURPURA: Status: ACTIVE | Noted: 2021-07-26

## 2021-07-26 PROBLEM — D69.2 SENILE PURPURA (HCC): Status: ACTIVE | Noted: 2021-07-26

## 2021-10-06 PROBLEM — G47.33 OSA (OBSTRUCTIVE SLEEP APNEA): Status: ACTIVE | Noted: 2021-10-06

## 2022-03-15 PROBLEM — I44.4 LAFB (LEFT ANTERIOR FASCICULAR BLOCK): Status: ACTIVE | Noted: 2022-03-15

## 2022-04-04 PROBLEM — E03.9 ACQUIRED HYPOTHYROIDISM: Status: ACTIVE | Noted: 2022-04-04

## 2022-04-04 PROBLEM — R00.0 SINUS TACHYCARDIA: Status: RESOLVED | Noted: 2021-04-20 | Resolved: 2022-04-04

## 2022-04-04 PROBLEM — I49.3 FREQUENT PVCS: Status: RESOLVED | Noted: 2017-10-02 | Resolved: 2022-04-04

## 2022-04-05 PROBLEM — S43.003A SUBLUXATION OF TENDON OF LONG HEAD OF BICEPS: Status: ACTIVE | Noted: 2022-04-05

## 2022-04-05 PROBLEM — M75.122 NONTRAUMATIC COMPLETE TEAR OF LEFT ROTATOR CUFF: Status: ACTIVE | Noted: 2022-04-05

## 2025-04-24 RX ORDER — PANTOPRAZOLE SODIUM 40 MG/1
40 TABLET, DELAYED RELEASE ORAL
COMMUNITY

## 2025-04-24 RX ORDER — POTASSIUM CHLORIDE 750 MG/1
10 TABLET, EXTENDED RELEASE ORAL 2 TIMES DAILY
COMMUNITY

## 2025-04-24 RX ORDER — BUMETANIDE 1 MG/1
1 TABLET ORAL DAILY
COMMUNITY

## 2025-04-24 RX ORDER — ROSUVASTATIN CALCIUM 5 MG/1
5 TABLET, COATED ORAL EVERY MORNING
COMMUNITY

## 2025-04-24 RX ORDER — IRBESARTAN 150 MG/1
150 TABLET ORAL DAILY
COMMUNITY

## 2025-04-28 ENCOUNTER — LABORATORY ENCOUNTER (OUTPATIENT)
Dept: LAB | Age: 85
End: 2025-04-28
Attending: ORTHOPAEDIC SURGERY
Payer: MEDICARE

## 2025-04-28 DIAGNOSIS — Z01.818 PRE-OP TESTING: ICD-10-CM

## 2025-04-28 LAB
CHLORIDE SERPL-SCNC: 98 MMOL/L (ref 98–112)
CO2 SERPL-SCNC: 28 MMOL/L (ref 21–32)
POTASSIUM SERPL-SCNC: 4.1 MMOL/L (ref 3.5–5.1)
SODIUM SERPL-SCNC: 135 MMOL/L (ref 136–145)

## 2025-04-28 PROCEDURE — 87081 CULTURE SCREEN ONLY: CPT

## 2025-04-28 PROCEDURE — 86850 RBC ANTIBODY SCREEN: CPT

## 2025-04-28 PROCEDURE — 86900 BLOOD TYPING SEROLOGIC ABO: CPT

## 2025-04-28 PROCEDURE — 86901 BLOOD TYPING SEROLOGIC RH(D): CPT

## 2025-04-28 PROCEDURE — 36415 COLL VENOUS BLD VENIPUNCTURE: CPT

## 2025-04-28 PROCEDURE — 80051 ELECTROLYTE PANEL: CPT

## 2025-04-29 LAB
ANTIBODY SCREEN: NEGATIVE
RH BLOOD TYPE: NEGATIVE

## 2025-05-14 NOTE — H&P (VIEW-ONLY)
Patient ID: Ruth Youssef     Chief Complaint:    Patient presents with:  Left Hip - Pre-Op       HPI:  Ruth Youssef is a 84 year old female who presents today for evaluation of their left hip pain. History of right total hip replacement back in 2019 by Dr. Estrada and no issues. Patient states pain started years ago. Pain is constant and described as sharp and shooting, throbbing, is worse with weight bearing. Pain is located in a C around the hip. Does endorse occasional low back pain and travels down the leg to the lateral calf. Had an injection into the spine that exacerbated that same symptoms.  However, having difficulty with even subtle movements of the hip. Factors that aggravate symptoms include weight bearing.  Patient denies numbness, tingling. Patient ambulates with a walker.   Patient states that the symptoms are getting progressively worse.  The pain is affecting their quality of life, ability to sleep at night, ability to perform activities and daily living and to ambulate.  Pain with walking and stair climbing.  They report they do walk with a limp.   Patient does note stiffness in the hip.     Physical Exam:     Vital Signs:  LMP 02/19/1989 (Approximate)     Past Medical History:   Diagnosis Date   • AK (actinic keratosis) 05/05/2016    chest   • Anxiety state    • Anxiety, generalized    • Aortic atherosclerosis (HCC) 04/19/2016   • Aorto-iliac atherosclerosis (HCC) 11/30/2019 1/30/19 CT   • Basal cell carcinoma 02/14/2012   • Breast CA (HCC) 09/01/2004   • Breast cancer (HCC) 2004   • Bronchiectasis without complication (HCC) 04/19/2016    Seen on CT chest   • Calculus of gallbladder 08/27/2015   • Cataract    • Depression    • Disorder of thyroid    • Dry eye 02/19/2015   • Ductal hyperplasia of breast 02/07/2017    Right side, 2017   • Esophageal reflux    • Exposure to medical diagnostic radiation    • Fibrosis lung (HCC) 04/19/2016    Seen at [facility], on CT scan, mild   •  Frequent PVCs 10/02/2017   • Gastroesophageal reflux disease without esophagitis- seen on esophogram  05/07/2020   • Glucose intolerance (impaired glucose tolerance) 05/31/2013   • Hearing impairment     radha hearing aids   • Hearing loss    • High blood pressure    • History of breast cancer 05/05/2016   • Hypertension    • Incomplete right bundle branch block 05/05/2016   • Left axis deviation 05/05/2016   • Left sided lacunar infarction (HCC), seen on MRI 08/25/2020   • Localized adiposity of neck 05/02/2017   • MALIG NEOPLASM SKIN EYELID 03/21/2008   • 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  Noted on CT 12/12/12.    • Severe obesity (BMI 35.0-39.9) 01/31/2016    Chart review.    • Sleep apnea    • Small vessel disease (HCC) 04/19/2016    Brain, MRI   • SNHL (sensorineural hearing loss) 06/03/2014   • Squamous cell carcinoma in situ of skin of face 02/07/2017   • Stroke (HCC)     possible \"small stroke\" per patient, date unknown   • Subclinical hypothyroidism    • Thyroid cyst 07/08/2014   • Thyroid nodule 07/08/2014   • Varicose veins of legs 04/02/2015   • Varicose veins of lower extremities with other complications 04/14/2015   • Wears hearing aid      Past Surgical History:   Procedure Laterality Date   • CHEMOTHERAPY  2004   • CHOLECYSTECTOMY     • COLONOSCOPY STOMA DX INCLUDING COLLJ SPEC SPX  12/14/12 - YANIRA Pemberton    ischemic cecal inflammatory mass, diverticuli, hemorrhoids.   • COLSC FLX W/RMVL OF TUMOR POLYP LESION SNARE TQ  05/07/2013    YANIRA Pemberton. diverticulosis, hemorrhoids, normal cecum. no repeat rec'd   •  CORRECTION HAMMERTOE  02/03/2011    Performed by ABDIEL SARMIENTO at [facility] [place], [facility]   • CORRJ HLX [facility] DSTL METAR OSTEOT  02/03/2011    Performed by ABDIEL SARMIENTO at [facility], [facility]   • CORRJ HLX [facility]CT[facility] W/DOUBLE OSTEOTOMY Bilateral    • EXCISIONAL BIOSPY RIGHT  2018   • HIP REPLACEMENT SURGERY Right 04/01/2021   • HX BREAST CANCER  09/2004   • LUMPECTOMY LEFT  09/2004   • NEEDLE BIOPSY RIGHT  2017   • OTHER SURGICAL HISTORY      L & R Knee surgery- Torn miniscus   • OTHER SURGICAL HISTORY      palate expansion   • OTHER SURGICAL HISTORY      lumpectomy   • OTHER SURGICAL HISTORY  05/22/2018    Cysto- Dr. Hernández   • OTHER SURGICAL HISTORY  04/07/2025    Cystoscopsy Dr. Sumner   • RADIATION LEFT  10/2004   • REPAIR UMBILICAL EARL,5+Y/O,REDUC N/A 06/05/2014    Procedure: UMBILICAL HERNIORRAPHY;  Surgeon: Hawa Pemberton MD;  Location: [facility], [facility]   • SKIN SURGERY  02/14/2012    BCC nodular / L infraorbital cheek / Mohs surgery by Dr. Tye Hernandez   • SKIN SURGERY  03/21/2017    MMS done for SCC mod well differentiated to right mid cheek done by AB   • SKIN SURGERY  11/08/2018    MMS, left temple- SCC,    • SKIN SURGERY  03/11/2019    MOHS, BCC - right medial canthus,    • SKIN SURGERY  04/03/2018    mohs, BCC - nasal tip,      Current Outpatient Medications   Medication Sig Dispense Refill   • acetaminophen 500 MG Oral Tab Take 2 tablets (1,000 mg total) by mouth every 8 (eight) hours for 14 days. 84 tablet 0   • aspirin 81 MG Oral Tab EC Take 1 tablet (81 mg total) by mouth 2 (two) times daily. To take for 6 weeks total for blood clot prevention. 84 tablet 0   • pregabalin (LYRICA) 75 MG Oral Cap Take 1 capsule (75 mg total) by mouth daily. 30 capsule 0   • OXYCODONE Oral Tab Take 0.5-1 tablets (2.5-5 mg total) by mouth every 4 (four) hours as needed for Pain. 30 tablet 0   • senna-docusate 8.6-50 MG Oral Tab Take 1 tablet by  mouth daily. Take daily while taking narcotics regularly for pain. 20 tablet 2   • traMADol 50 MG Oral Tab Take 1 tablet (50 mg total) by mouth every 4 to 6 hours as needed for Pain (Can alternate with Oxycodone). Do not take along with oxycodone or norco (hydrocodone) - separate by 2 hours if needed 40 tablet 0   • neomycin-polymyxin-dexamethasone 3.5-73006-2.1 Ophthalmic Ointment Apply bid to the surgical eyelid x 1 week     • carvedilol 6.25 MG Oral Tab Take 1 tablet (6.25 mg total) by mouth 2 (two) times daily with meals. 60 tablet 1   • potassium chloride 10 MEQ Oral Tab CR Take 1 tablet (10 mEq total) by mouth daily. 180 tablet 3   • bumetanide 1 MG Oral Tab Take 1 tablet (1 mg total) by mouth daily. 60 tablet 6   • fluorometholone 0.1 % Ophthalmic Suspension 1 drop to od, b.I.d. for up to 14 days prn irritation, redness 5 mL 0   • Irbesartan 150 MG Oral Tab Take 1 tablet (150 mg total) by mouth daily. 90 tablet 3   • levothyroxine 25 MCG Oral Tab Take 1 tablet (25 mcg total) by mouth before breakfast. 90 tablet 3   • fluorouracil 5 % External Cream Apply 1 Application topically 2 (two) times daily. Apply twice daily x 1-2 weeks to entire chest, neck, upper back 40 g 0   • pantoprazole 40 MG Oral Tab EC Take twice daily for 1 week and then decrease to one daily. 90 tablet 3   • meclizine 25 MG Oral Tab TAKE 1 TABLET(25 MG) BY MOUTH THREE TIMES DAILY 90 tablet 0   • rosuvastatin 5 MG Oral Tab TAKE 1 TABLET(5 MG) BY MOUTH DAILY 90 tablet 2   • famotidine 40 MG Oral Tab Take 1 tablet (40 mg total) by mouth at bedtime. 30 tablet 3   • erythromycin 5 MG/GM Ophthalmic Ointment Apply up to 1 inch to the eye with mynorazion, qhs for up to 1 week 3.5 g 0   • ondansetron 4 MG Oral Tablet Dispersible Take 1 tablet (4 mg total) by mouth every 8 (eight) hours as needed for Nausea. 10 tablet 0   • aspirin 81 MG Oral Tab EC Take 81 mg by mouth daily.     • Alclometasone Dipropionate 0.05 % External Cream Apply to AA trunk bid  prn 60 g 2   • Multiple Vitamins-Minerals (MULTIVITAMIN ADULT OR) Take by mouth daily.     • VITAMIN D 2000 UNIT OR TABS Take by mouth.         No Known Allergies  Social History    Tobacco Use      Smoking status: Never      Smokeless tobacco: Never    Alcohol use: Yes      Alcohol/week: 0.0 - 2.0 standard drinks of alcohol    Family History   Problem Relation Age of Onset   • Other (arthritis) Father    • Breast Cancer Paternal Grandmother 75        Age at dx 75   • Diabetes Maternal Grandmother    • Heart Disorder Brother        ROS:     All other pertinent positives and negatives as noted above in HPI.    Physical Exam  Vital Signs:  LMP 02/19/1989 (Approximate)   Estimated body mass index is 37.85 kg/m² as calculated from the following:    Height as of 5/1/25: 5' (1.524 m).    Weight as of 5/1/25: 193 lb 12.8 oz (87.9 kg).    antalgic gait  with assistive device    Right hip:  internal (15 degrees) and external rotation (25 degrees) with no pain in the groin region, Limited abduction (20) degrees, adduction (15 degrees) and flexion ( -5-95 degrees)         5/5 strength  5/5 knee flexion and extension  5/5 ankle dorsiflexion and plantarflexion  Sensation grossly intact in thigh, leg and foot  Negative Trendelenburg sign  Negative Stinchfield sign  2+ pedal pulses and brisk capillary refill  No skin rashes or lesion noted    Left hip: Limited internal (5 degrees) and external rotation (15 degrees) with  pain in the groin region, Limited abduction (0-15/20) degrees, adduction (15 degrees) and flexion ( -5-95 degrees)      5/5 strength  5/5 knee flexion and extension  5/5 ankle dorsiflexion and plantarflexion  Sensation grossly intact in thigh, leg and foot  Negative Trendelenburg sign  Negative Stinchfield sign  2+ pedal pulses and brisk capillary refill  No skin rashes or lesions noted    Back:  Deformity: no  Pelvic obliquity: no  Tenderness: no      Diagnostic Studies:     Radiographs:    Imaging was personally  and individually reviewed and discussed at length with the patient:      Two views of the left hip(s) were reviewed in the office today, including AP pelvis and lateral views.  There is severe joint space narrowing (bone-on-bone) with large osteophyte formation off the acetabulum and the head neck junction.  There is sclerosis noted in the femoral head and acetabulum. There is no fracture or dislocation.  No periosteal reactions or medullary lesions are seen.  Right total hip noted without changes from previous imaging with slightly vertical up.    Hgb: 15.5  Alb: 4.1 (2024)  Cr: 1.15  GFR: 47.1   MSSA/MRSA: Negative  A1C: 6.1        Assessment:     left hip osteoarthritis  Hx of right total hip (Dr Estrada 2019)  Lumbago with radiculopathy             Plan:       Continuation of conservative management vs. LYNNE discussed.  The patient wishes to proceed with left total hip replacement.      One or more of the conservative treatments have been tried and failed for three months or more except in special circumstances where delay of definitive care is not appropriate: non steroid anti-infammatory medication(s) and physical therapy      Risk and benefits of surgery were reviewed.  Including, but not limited to, blood clots, anesthesia risk, infection, leg length discrepancy, failure of the implant, need for future surgeries, continued pain, hematoma, need for transfusion, and death, among others.  The patient understands and wishes to proceed.     The spectrum of treatment options were discussed with the patient in detail including both the nonoperative and operative treatment modalities and their respective risks and benefits.  After thorough discussion, the patient has elected to undergo surgical treatment.  The details of the surgical procedure were explained including the location of probable incisions and a description of the likely implants to be used.  Models and diagrams were used as educational resources. The  patient understands the likely convalescence after surgery, as well as the rehabilitation required.  We thoroughly discussed the risks, benefits, and alternatives to surgery.  The risks include but are not limited to the risk of infection, joint stiffness, blood clots (including DVT and/or pulmonary embolus along with the risk of death), neurologic and/or vascular injury, fracture, dislocation, nonunion, malunion, need for further surgery including hardware failure requiring revision, and continued pain.  It was explained that if tissue has been repaired or reconstructed, there is also a chance of failure which may require further management.  In addition, we have discussed the risks, including the risk of disease transmission, associated with any allograft tissue which may be utilized.  Following the completion of the discussion, the patient expressed understanding of this planned course of care, all their questions were answered and consent will be obtained preoperatively.    The risks, benefits and alternatives of surgery were discussed with the patient including, but not limited to:   You may be allergic to the medicine that you get during surgery.   The nerves or tendons around your hip may be hurt during surgery.   You may bleed more than expected, requiring blood transfusion.  You may get an infection which will require additional surgery and possibly removal of all implants to cure. Patients with diabetes or who are on certain medications to suppress the immune system are particularly at risk for infection. Patients over 40 BMI are at significant risk for infection and wound healing problems.  You could have a fracture during surgery, or within several weeks after surgery. Patients with osteopenia or osteoporosis are at increased risk for fracture during and after surgery.  You may have problems with your heart and/or kidneys. Patients with history of heart disease are at increased risk for cardiac  complications and some of the medications used during and after surgery may affect kidney function.  After surgery, your hip may be stiff and painful. Your hip and knee may swell. This usually lasts about 6 weeks and may be permanent.  Your legs may not be the same length.   Your implant may get loose or move out of place causing pain.  The implant may get worn out over time and need to be replaced.   After having surgery, you may lose your balance and be more likely to fall for a time.   You can dislocate your hip which would require an additional procedure and potentially surgery to correct.  You may get a blood clot in your leg or arm. This can cause pain and swelling, and it can stop blood from flowing where it needs to go in your body. The blood clot can break loose and travel to your lungs or brain. A blood clot in your lungs can cause chest pain, trouble breathing and possibly death. A blood clot in your brain can cause a stroke. These problems can be life-threatening.       Pain medication discussed.    Does have occasional symptoms that sound consistent with radicular symptoms as well, but majority of complaints today are with hip ROM. Discussed that sometimes improving gait can help with back pathology but still may have residual symptoms there that may require further workup/intervention by spine team if not improved.     Left LYNNE at [facility] (Obs) - 05/19/2025    no smoking  no diabetes - Last A1c value was 5.9% done 3/5/2024.  BMI - 37  yes MICHELLE - not tolerating CPAP  no hx of infections/MRSA/dental/joint  no hx of blood clots   no blood thinner  No injections to hip in past   No hx of lumbar surgery  Hx of CHF Cardiac history  No Pulmonary history       - risks, benefits and alternatives discussed  - labs reviewed and medications sent to pharmacy  - no NSAIDs d/t GFR  - proceed with left total hip arthroplasty as planned      Diagnoses and all orders for this visit:    Status post left hip replacement  -      acetaminophen 500 MG Oral Tab; Take 2 tablets (1,000 mg total) by mouth every 8 (eight) hours for 14 days.  -     aspirin 81 MG Oral Tab EC; Take 1 tablet (81 mg total) by mouth 2 (two) times daily. To take for 6 weeks total for blood clot prevention.  -     PT AND/OR OT EVAL & TREAT ([facility]); Standing  -     HOME HEALTH (EXT)  -     pregabalin (LYRICA) 75 MG Oral Cap; Take 1 capsule (75 mg total) by mouth daily.  -     OXYCODONE Oral Tab; Take 0.5-1 tablets (2.5-5 mg total) by mouth every 4 (four) hours as needed for Pain.  -     senna-docusate 8.6-50 MG Oral Tab; Take 1 tablet by mouth daily. Take daily while taking narcotics regularly for pain.  -     traMADol 50 MG Oral Tab; Take 1 tablet (50 mg total) by mouth every 4 to 6 hours as needed for Pain (Can alternate with Oxycodone). Do not take along with oxycodone or norco (hydrocodone) - separate by 2 hours if needed    Primary osteoarthritis of left hip              Plan reviewed with Dr. Adamaris Gutiérrez, FNP-BC

## 2025-05-16 ENCOUNTER — ANESTHESIA EVENT (OUTPATIENT)
Dept: SURGERY | Facility: HOSPITAL | Age: 85
End: 2025-05-16
Payer: MEDICARE

## 2025-05-16 NOTE — DISCHARGE INSTRUCTIONS
Sometimes managing your health at home requires assistance.  The [facility] team has recognized your preference to use [facility].  They can be reached by phone at [phone].  The fax number for your reference is [phone].  A representative from the home health agency will contact you or your family to schedule your first visit.     Hip Replacement Discharge Instructions    Dear Patient,     [facility] cares about your progress with recovery following your joint replacement surgery.     300 days from your scheduled surgery, [facility] will send you a follow-up survey to help us understand how your surgery impacted your mobility, pain, and overall quality of life. Please make every effort to complete this survey. The information collected from this survey will be used by your physician to track your recovery.     Sincerely,     [facility] [place]    Activity    Bathing  No tub baths, pools, or saunas until cleared by surgeon (about 4-6 weeks because it takes that long for the incision on the skin to heal and be a barrier to prevent infection.)  When allowed to shower:      AQUACEL dressing is waterproof and does not require being covered before showering.  Pat dressing and surrounding skin dry after shower                      AQUACEL        MEDIPORE/COVERLET dressing is NOT waterproof and REQUIRES being covered with a waterproof barrier to keep the dressing and incision dry.  SARAN WRAP, GLAD WRAP, PRESS N SEAL WORK REALLY WELL BUT ANY PLASTIC WRAP WILL DO.  Do not wash incision.   Remove entire wrapping and old dressing (if Medipore/coverlet) after showering. Pat dry with a CLEAN TOWEL if necessary and cover incision with new Medipore/coverlet. For other types of dressings, follow surgeon’s orders.                  MEDIPORE/COVERLET            Driving  Do not drive until cleared by surgeon. This is usually four to six weeks after surgery.  Discuss this at follow-up office visit.   Not allowed while taking narcotic pain medication or muscle relaxants.    Sex  Usually allowed after four to six weeks - check with surgeon at your office visit.  Return to work  Usually allowed after four to six weeks. Discuss specific work activities with your surgeon.    Restrictions  For hip replacement surgery, follow instructions provided by physical therapy    No smoking  Avoid smoking. It is known to cause breathing problems and can decrease the rate of healing.    Incision care/Dressing changes  Wash hands before and after dressing changes.    FOR MEDIPORE/COVERLET DRESSINGS:  Change dressing daily using Medipore/coverlet once Aquacel (waterproof) dressing is removed (which is about 7 days after surgery). Patient should be standing or lying flat so dressing goes on smoothly.  (This dressing needed for hip patients because of location of incision-don’t want contamination from bathroom use)   Continue this until your first visit with your surgeon’s office.  There could be a small amount of redness around the staples or incision; this is normal.  Watch for increased redness, warmth, any odor, increased drainage or opening of the incision. A little clear yellow or blood tinged drainage is normal up to 2 weeks after surgery but it should be less every day until it stops.  Call physician if you notice any concerning changes.  Sutures/staples will be removed at first office visit (10 days- 3 weeks).                         MEDIPORE /COVERLET          Medication  Anticoagulants = blood thinners (Xarelto, Eliquis, Lovenox, Coumadin or Aspirin)  Pill or shot form depending on what your physician orders.   IF placed on Coumadin, you may also need lab work done for monitoring.  You will bleed easier and bruise easier while on these medications.     Usually you will be on a blood thinner for about 4-5 weeks.  Contact your physician if you have signs of bruising, nose bleeds or  blood in your urine. Use electric razors and soft toothbrushes only.  Do not take aspirin while taking blood thinners unless ordered by your physician.  Review anticoagulant education information sheet provided.    Discomfort  Surgical discomfort is normal for one to two months.  Have realistic goals and keep a positive outlook.  Keep pain manageable; pain should not disrupt your sleep or activities like getting out of bed or walking.  You may need pain medication regularly (every 4-6 hours) the first 2 weeks and then begin to decrease how often you are taking it.  Take pain medication as prescribed with food, especially before therapy, allowing 30-60 minutes to take effect.  Do not drink alcohol while on pain medication.  As you have less discomfort, decrease the amount of pain medication you take. Use plain Tylenol (acetaminophen) for less severe pain.  Some pain medications have Tylenol (acetaminophen) in them such as Norco and Percocet. Do NOT take Tylenol (acetaminophen) within 4 hours of a dose of these medications.  Apply ice  or cold therapy to surgical site for 20 minutes at least four times a day, especially after therapy. Be sure there is a thin cloth barrier between skin and ice or cold therapy.  Change position at least every 45 minutes while awake to avoid stiffness or increased discomfort.  Deep breathing and relaxation techniques and distractions can help!  If you focus on something else, you do not experience the pain the same. Take advantage of everything available to your to help control you discomfort.  Contact physician if discomfort does not respond to pain medication.    Body changes  Constipation is common with the use of narcotics.  Eat fiber rich foods and drink plenty of fluids.  Use stool softeners such as Colace or Senakot while on narcotics, and laxatives such as Miralax or Milk of Magnesia if needed.   An enema or suppository may be needed if above measures do not work.    Prevention  of infection and promotion of healing  Good hand washing is important. Everyone should wash their hands or use hand  as soon as they walk in your house-whether they live there or are visiting.  Keep bed linen/clothing freshly laundered.  Do not allow others or pets to touch your incision.  Avoid people that have colds or the flu.  Your surgeon may recommend that you take antibiotics before you undergo any dental or other invasive surgical procedures after your joint replacement. Speak with your physician about this at your post-op office visit.  Eat a balanced diet high in fiber and drink plenty of fluids.   Continue using incentive spirometry because narcotics make you sleepy so you may not take good deep breaths. We do not want you to get pneumonia.     Post op Office visits  Schedule 10 days to 3 weeks after surgery WITH SURGEON’s office.  Additional visits may need to be scheduled. Your physician will discuss this at first post-op office visit.  Schedule outpatient physical therapy per your surgeon’s orders.  Schedule one week follow up after surgery WITH PRIMARY CARE PHYSICIAN; review your medications over last 6 months.  Your body gets stressed by surgery and that stress can affect all your other health issues (such as high blood pressure, diabetes, CHF, afib, and asthma just to name a few).  We don’t want those other health issues to cause you to get readmitted to the hospital; much better for you to catch developing problems and prevent them from becoming larger ones.  DION HOSE - IF ordered by your surgeon, wear these during the day and off at night.  Surgeon will tell you when you don’t need them anymore.    Notify your surgeon if you notice any of the following signs  Separation of incision line.  Increased redness, swelling, or warmth of skin around incision.  Increased or foul smelling drainage from incision  Red streaks on skin near incision.  Temperature >100.4F.  Increased pain at incision  not relieved by pain medication.    Signs of Possible Dislocation  Increased severe leg or groin pain  Turning in or out of surgical leg that is new  Increased numbness, tingling to leg  Inability to walk or put weight on your surgical leg        Signs of blood clot  Pain, excessive tenderness, redness, or swelling in leg or calf (other than incision site).    Go directly to the ER or CALL 911 if  you:  become short of breath  have chest pain  cough up blood  have unexplained anxiety with breathing   Traveling and Handicapped parking  Check with you surgeon when you are allowed to travel so you don’t set yourself up for greater chance of complications.  If traveling by car, get out to stretch every 2 hours.  This helps prevent stiffness.  You may need to do this any time you travel for the first year after surgery.  If traveling by plane, BEFORE you get into a security line, let them know that you had your hip replaced, as you will most likely set off the metal detector. The doctors no longer provide an identification card for this as they are easily copied. ALSO request a wheelchair the first year to board and get off a plane…this aids in priority seating and you should sit on the aisle or at the bulkhead where you can easily stretch your legs and get up to walk up and down the aisles…this helps prevent blood clots and stiffness.  TEMPORARY HANDICAP PARKING APPLICATION  (good for 3-6 months)  - At Surgeon or PCP visit, request they fill out the form, then go to [employer] (only time you do not wait in a long line there). Some [facility] offices provide the same service. ([facility] and [facility] have this service; if you live in another [facility], you may check with them as well). You need space to open car doors to position yourself properly with walker to get in and out of your car safely; some parking spaces are  practically on top of each other and do not give you enough room.     SPECIAL   INSTRUCTIONS:  Spanda- hip replacement(single layer compression sleeve):     All patients should wear the compression sleeve until first follow up visit to surgeon’s office (about 2-3 weeks) and then check with surgeon if need to continue use.  Take off to shower. Best to keep on as much as possible, even at night, except when washing out.  Wash with mild soap and water; DRIP dry overnight- put back on before getting up for the day.  Use one long tube on the calf.   It should end up just below the back of the knee and the other end on the ball of the foot to expose the toes.   Makes sure it is NOT bunched up anywhere.  IF the Spanda- feels TOO tight, then REMOVE it and call your surgeon to let them know.

## 2025-05-19 ENCOUNTER — APPOINTMENT (OUTPATIENT)
Dept: GENERAL RADIOLOGY | Facility: HOSPITAL | Age: 85
End: 2025-05-19
Attending: ORTHOPAEDIC SURGERY
Payer: MEDICARE

## 2025-05-19 ENCOUNTER — HOSPITAL ENCOUNTER (OUTPATIENT)
Facility: HOSPITAL | Age: 85
Discharge: HOME HEALTH | End: 2025-05-20
Attending: ORTHOPAEDIC SURGERY | Admitting: ORTHOPAEDIC SURGERY
Payer: MEDICARE

## 2025-05-19 ENCOUNTER — ANESTHESIA (OUTPATIENT)
Dept: SURGERY | Facility: HOSPITAL | Age: 85
End: 2025-05-19
Payer: MEDICARE

## 2025-05-19 DIAGNOSIS — Z01.818 PRE-OP TESTING: Primary | ICD-10-CM

## 2025-05-19 PROCEDURE — 73501 X-RAY EXAM HIP UNI 1 VIEW: CPT | Performed by: ORTHOPAEDIC SURGERY

## 2025-05-19 PROCEDURE — 88304 TISSUE EXAM BY PATHOLOGIST: CPT | Performed by: ORTHOPAEDIC SURGERY

## 2025-05-19 PROCEDURE — 94760 N-INVAS EAR/PLS OXIMETRY 1: CPT

## 2025-05-19 PROCEDURE — 76000 FLUOROSCOPY <1 HR PHYS/QHP: CPT | Performed by: ORTHOPAEDIC SURGERY

## 2025-05-19 PROCEDURE — 88311 DECALCIFY TISSUE: CPT | Performed by: ORTHOPAEDIC SURGERY

## 2025-05-19 DEVICE — IMPLANTABLE DEVICE
Type: IMPLANTABLE DEVICE | Site: HIP | Status: FUNCTIONAL
Brand: REFOBACIN® BONE CEMENT R

## 2025-05-19 DEVICE — IMPLANTABLE DEVICE
Type: IMPLANTABLE DEVICE | Site: HIP | Status: FUNCTIONAL
Brand: G7® VIVACIT-E®

## 2025-05-19 DEVICE — IMPLANTABLE DEVICE
Type: IMPLANTABLE DEVICE | Site: HIP | Status: FUNCTIONAL
Brand: AVENIR® MÜLLER

## 2025-05-19 DEVICE — BIOLOX® DELTA, CERAMIC FEMORAL HEAD, S, Ø 36/-3.5, TAPER 12/14
Type: IMPLANTABLE DEVICE | Site: HIP | Status: FUNCTIONAL
Brand: BIOLOX® DELTA

## 2025-05-19 DEVICE — BONE PREPARATION KIT
Type: IMPLANTABLE DEVICE | Site: HIP | Status: FUNCTIONAL
Brand: BIOPREP

## 2025-05-19 DEVICE — IMPLANTABLE DEVICE
Type: IMPLANTABLE DEVICE | Site: HIP | Status: FUNCTIONAL
Brand: G7® ACETABULAR SYSTEM

## 2025-05-19 RX ORDER — TRAMADOL HYDROCHLORIDE 50 MG/1
50 TABLET ORAL
COMMUNITY

## 2025-05-19 RX ORDER — PHENYLEPHRINE HCL 10 MG/ML
VIAL (ML) INJECTION AS NEEDED
Status: DISCONTINUED | OUTPATIENT
Start: 2025-05-19 | End: 2025-05-19 | Stop reason: SURG

## 2025-05-19 RX ORDER — NALOXONE HYDROCHLORIDE 0.4 MG/ML
0.08 INJECTION, SOLUTION INTRAMUSCULAR; INTRAVENOUS; SUBCUTANEOUS AS NEEDED
Status: DISCONTINUED | OUTPATIENT
Start: 2025-05-19 | End: 2025-05-19 | Stop reason: HOSPADM

## 2025-05-19 RX ORDER — TRAMADOL HYDROCHLORIDE 50 MG/1
50 TABLET ORAL EVERY 6 HOURS SCHEDULED
Status: DISCONTINUED | OUTPATIENT
Start: 2025-05-19 | End: 2025-05-20

## 2025-05-19 RX ORDER — ACETAMINOPHEN 500 MG
1000 TABLET ORAL ONCE
Status: DISCONTINUED | OUTPATIENT
Start: 2025-05-19 | End: 2025-05-19 | Stop reason: HOSPADM

## 2025-05-19 RX ORDER — FERROUS SULFATE 325(65) MG
325 TABLET, DELAYED RELEASE (ENTERIC COATED) ORAL
Status: DISCONTINUED | OUTPATIENT
Start: 2025-05-20 | End: 2025-05-20

## 2025-05-19 RX ORDER — LOSARTAN POTASSIUM 50 MG/1
50 TABLET ORAL DAILY
Status: DISCONTINUED | OUTPATIENT
Start: 2025-05-20 | End: 2025-05-20

## 2025-05-19 RX ORDER — AMOXICILLIN 250 MG
1 CAPSULE ORAL DAILY
COMMUNITY

## 2025-05-19 RX ORDER — LEVOTHYROXINE SODIUM 25 UG/1
25 TABLET ORAL
Status: DISCONTINUED | OUTPATIENT
Start: 2025-05-20 | End: 2025-05-20

## 2025-05-19 RX ORDER — EPHEDRINE SULFATE 50 MG/ML
INJECTION INTRAVENOUS AS NEEDED
Status: DISCONTINUED | OUTPATIENT
Start: 2025-05-19 | End: 2025-05-19 | Stop reason: SURG

## 2025-05-19 RX ORDER — FAMOTIDINE 10 MG/ML
20 INJECTION, SOLUTION INTRAVENOUS 2 TIMES DAILY
Status: DISCONTINUED | OUTPATIENT
Start: 2025-05-19 | End: 2025-05-20

## 2025-05-19 RX ORDER — HYDROMORPHONE HYDROCHLORIDE 1 MG/ML
0.4 INJECTION, SOLUTION INTRAMUSCULAR; INTRAVENOUS; SUBCUTANEOUS EVERY 5 MIN PRN
Status: DISCONTINUED | OUTPATIENT
Start: 2025-05-19 | End: 2025-05-19 | Stop reason: HOSPADM

## 2025-05-19 RX ORDER — SODIUM CHLORIDE, SODIUM LACTATE, POTASSIUM CHLORIDE, CALCIUM CHLORIDE 600; 310; 30; 20 MG/100ML; MG/100ML; MG/100ML; MG/100ML
INJECTION, SOLUTION INTRAVENOUS CONTINUOUS
Status: DISCONTINUED | OUTPATIENT
Start: 2025-05-19 | End: 2025-05-19 | Stop reason: HOSPADM

## 2025-05-19 RX ORDER — ROCURONIUM BROMIDE 10 MG/ML
INJECTION, SOLUTION INTRAVENOUS AS NEEDED
Status: DISCONTINUED | OUTPATIENT
Start: 2025-05-19 | End: 2025-05-19 | Stop reason: SURG

## 2025-05-19 RX ORDER — HYDROMORPHONE HYDROCHLORIDE 1 MG/ML
0.6 INJECTION, SOLUTION INTRAMUSCULAR; INTRAVENOUS; SUBCUTANEOUS EVERY 5 MIN PRN
Status: DISCONTINUED | OUTPATIENT
Start: 2025-05-19 | End: 2025-05-19 | Stop reason: HOSPADM

## 2025-05-19 RX ORDER — METOCLOPRAMIDE HYDROCHLORIDE 5 MG/ML
10 INJECTION INTRAMUSCULAR; INTRAVENOUS EVERY 8 HOURS PRN
Status: DISCONTINUED | OUTPATIENT
Start: 2025-05-19 | End: 2025-05-19 | Stop reason: HOSPADM

## 2025-05-19 RX ORDER — ONDANSETRON 2 MG/ML
INJECTION INTRAMUSCULAR; INTRAVENOUS AS NEEDED
Status: DISCONTINUED | OUTPATIENT
Start: 2025-05-19 | End: 2025-05-19 | Stop reason: SURG

## 2025-05-19 RX ORDER — CARVEDILOL 6.25 MG/1
6.25 TABLET ORAL 2 TIMES DAILY WITH MEALS
Status: DISCONTINUED | OUTPATIENT
Start: 2025-05-19 | End: 2025-05-20

## 2025-05-19 RX ORDER — POLYETHYLENE GLYCOL 3350 17 G/17G
17 POWDER, FOR SOLUTION ORAL DAILY PRN
Status: DISCONTINUED | OUTPATIENT
Start: 2025-05-19 | End: 2025-05-20

## 2025-05-19 RX ORDER — DIPHENHYDRAMINE HYDROCHLORIDE 50 MG/ML
25 INJECTION, SOLUTION INTRAMUSCULAR; INTRAVENOUS ONCE AS NEEDED
Status: ACTIVE | OUTPATIENT
Start: 2025-05-19 | End: 2025-05-19

## 2025-05-19 RX ORDER — PREGABALIN 75 MG/1
75 CAPSULE ORAL DAILY
COMMUNITY

## 2025-05-19 RX ORDER — HYDROMORPHONE HYDROCHLORIDE 1 MG/ML
0.2 INJECTION, SOLUTION INTRAMUSCULAR; INTRAVENOUS; SUBCUTANEOUS EVERY 5 MIN PRN
Status: DISCONTINUED | OUTPATIENT
Start: 2025-05-19 | End: 2025-05-19 | Stop reason: HOSPADM

## 2025-05-19 RX ORDER — DEXAMETHASONE SODIUM PHOSPHATE 10 MG/ML
8 INJECTION, SOLUTION INTRAMUSCULAR; INTRAVENOUS ONCE
Status: COMPLETED | OUTPATIENT
Start: 2025-05-20 | End: 2025-05-20

## 2025-05-19 RX ORDER — TRANEXAMIC ACID 10 MG/ML
1000 INJECTION, SOLUTION INTRAVENOUS ONCE
Status: COMPLETED | OUTPATIENT
Start: 2025-05-19 | End: 2025-05-19

## 2025-05-19 RX ORDER — FAMOTIDINE 20 MG/1
20 TABLET, FILM COATED ORAL 2 TIMES DAILY
Status: DISCONTINUED | OUTPATIENT
Start: 2025-05-19 | End: 2025-05-20

## 2025-05-19 RX ORDER — SENNOSIDES 8.6 MG
17.2 TABLET ORAL NIGHTLY
Status: DISCONTINUED | OUTPATIENT
Start: 2025-05-19 | End: 2025-05-20

## 2025-05-19 RX ORDER — BISACODYL 10 MG
10 SUPPOSITORY, RECTAL RECTAL
Status: DISCONTINUED | OUTPATIENT
Start: 2025-05-19 | End: 2025-05-20

## 2025-05-19 RX ORDER — DIPHENHYDRAMINE HYDROCHLORIDE 50 MG/ML
12.5 INJECTION, SOLUTION INTRAMUSCULAR; INTRAVENOUS EVERY 4 HOURS PRN
Status: DISCONTINUED | OUTPATIENT
Start: 2025-05-19 | End: 2025-05-20

## 2025-05-19 RX ORDER — OXYCODONE HYDROCHLORIDE 5 MG/1
5 TABLET ORAL EVERY 4 HOURS PRN
Status: DISCONTINUED | OUTPATIENT
Start: 2025-05-19 | End: 2025-05-20

## 2025-05-19 RX ORDER — HYDROMORPHONE HYDROCHLORIDE 1 MG/ML
0.4 INJECTION, SOLUTION INTRAMUSCULAR; INTRAVENOUS; SUBCUTANEOUS EVERY 2 HOUR PRN
Status: DISCONTINUED | OUTPATIENT
Start: 2025-05-19 | End: 2025-05-20

## 2025-05-19 RX ORDER — ROSUVASTATIN CALCIUM 5 MG/1
5 TABLET, COATED ORAL EVERY MORNING
Status: DISCONTINUED | OUTPATIENT
Start: 2025-05-20 | End: 2025-05-20

## 2025-05-19 RX ORDER — SODIUM CHLORIDE, SODIUM LACTATE, POTASSIUM CHLORIDE, CALCIUM CHLORIDE 600; 310; 30; 20 MG/100ML; MG/100ML; MG/100ML; MG/100ML
INJECTION, SOLUTION INTRAVENOUS CONTINUOUS
Status: DISCONTINUED | OUTPATIENT
Start: 2025-05-19 | End: 2025-05-20

## 2025-05-19 RX ORDER — BUMETANIDE 1 MG/1
1 TABLET ORAL DAILY
Status: DISCONTINUED | OUTPATIENT
Start: 2025-05-20 | End: 2025-05-20

## 2025-05-19 RX ORDER — DIPHENHYDRAMINE HCL 25 MG
25 CAPSULE ORAL EVERY 4 HOURS PRN
Status: DISCONTINUED | OUTPATIENT
Start: 2025-05-19 | End: 2025-05-20

## 2025-05-19 RX ORDER — ACETAMINOPHEN 500 MG
1000 TABLET ORAL EVERY 8 HOURS SCHEDULED
Status: DISCONTINUED | OUTPATIENT
Start: 2025-05-19 | End: 2025-05-20

## 2025-05-19 RX ORDER — ACETAMINOPHEN 500 MG
1000 TABLET ORAL EVERY 8 HOURS
COMMUNITY

## 2025-05-19 RX ORDER — DOCUSATE SODIUM 100 MG/1
100 CAPSULE, LIQUID FILLED ORAL 2 TIMES DAILY
Status: DISCONTINUED | OUTPATIENT
Start: 2025-05-19 | End: 2025-05-20

## 2025-05-19 RX ORDER — ALBUTEROL SULFATE 0.83 MG/ML
2.5 SOLUTION RESPIRATORY (INHALATION) AS NEEDED
Status: DISCONTINUED | OUTPATIENT
Start: 2025-05-19 | End: 2025-05-19 | Stop reason: HOSPADM

## 2025-05-19 RX ORDER — LIDOCAINE HYDROCHLORIDE 10 MG/ML
INJECTION, SOLUTION EPIDURAL; INFILTRATION; INTRACAUDAL; PERINEURAL AS NEEDED
Status: DISCONTINUED | OUTPATIENT
Start: 2025-05-19 | End: 2025-05-19 | Stop reason: SURG

## 2025-05-19 RX ORDER — CARVEDILOL 6.25 MG/1
6.25 TABLET ORAL 2 TIMES DAILY WITH MEALS
COMMUNITY

## 2025-05-19 RX ORDER — SODIUM PHOSPHATE, DIBASIC AND SODIUM PHOSPHATE, MONOBASIC 7; 19 G/230ML; G/230ML
1 ENEMA RECTAL ONCE AS NEEDED
Status: DISCONTINUED | OUTPATIENT
Start: 2025-05-19 | End: 2025-05-20

## 2025-05-19 RX ORDER — ONDANSETRON 2 MG/ML
4 INJECTION INTRAMUSCULAR; INTRAVENOUS EVERY 6 HOURS PRN
Status: DISCONTINUED | OUTPATIENT
Start: 2025-05-19 | End: 2025-05-20

## 2025-05-19 RX ORDER — ONDANSETRON 2 MG/ML
4 INJECTION INTRAMUSCULAR; INTRAVENOUS EVERY 6 HOURS PRN
Status: DISCONTINUED | OUTPATIENT
Start: 2025-05-19 | End: 2025-05-19 | Stop reason: HOSPADM

## 2025-05-19 RX ORDER — HYDROMORPHONE HYDROCHLORIDE 1 MG/ML
0.2 INJECTION, SOLUTION INTRAMUSCULAR; INTRAVENOUS; SUBCUTANEOUS EVERY 2 HOUR PRN
Status: DISCONTINUED | OUTPATIENT
Start: 2025-05-19 | End: 2025-05-20

## 2025-05-19 RX ORDER — ACETAMINOPHEN 325 MG/1
650 TABLET ORAL ONCE
Status: COMPLETED | OUTPATIENT
Start: 2025-05-19 | End: 2025-05-19

## 2025-05-19 RX ORDER — ASPIRIN 81 MG/1
81 TABLET ORAL 2 TIMES DAILY
Status: DISCONTINUED | OUTPATIENT
Start: 2025-05-19 | End: 2025-05-20

## 2025-05-19 RX ORDER — METOCLOPRAMIDE HYDROCHLORIDE 5 MG/ML
10 INJECTION INTRAMUSCULAR; INTRAVENOUS EVERY 8 HOURS PRN
Status: DISCONTINUED | OUTPATIENT
Start: 2025-05-19 | End: 2025-05-20

## 2025-05-19 RX ORDER — DEXAMETHASONE SODIUM PHOSPHATE 4 MG/ML
VIAL (ML) INJECTION AS NEEDED
Status: DISCONTINUED | OUTPATIENT
Start: 2025-05-19 | End: 2025-05-19 | Stop reason: SURG

## 2025-05-19 RX ORDER — OXYCODONE HYDROCHLORIDE 5 MG/1
TABLET ORAL EVERY 4 HOURS PRN
COMMUNITY

## 2025-05-19 RX ORDER — ACETAMINOPHEN 325 MG/1
TABLET ORAL
Status: COMPLETED
Start: 2025-05-19 | End: 2025-05-19

## 2025-05-19 RX ADMIN — ONDANSETRON 4 MG: 2 INJECTION INTRAMUSCULAR; INTRAVENOUS at 13:00:00

## 2025-05-19 RX ADMIN — PHENYLEPHRINE HCL 100 MCG: 10 MG/ML VIAL (ML) INJECTION at 13:55:00

## 2025-05-19 RX ADMIN — SODIUM CHLORIDE, SODIUM LACTATE, POTASSIUM CHLORIDE, CALCIUM CHLORIDE: 600; 310; 30; 20 INJECTION, SOLUTION INTRAVENOUS at 14:44:00

## 2025-05-19 RX ADMIN — ROCURONIUM BROMIDE 50 MG: 10 INJECTION, SOLUTION INTRAVENOUS at 12:47:00

## 2025-05-19 RX ADMIN — PHENYLEPHRINE HCL 100 MCG: 10 MG/ML VIAL (ML) INJECTION at 14:17:00

## 2025-05-19 RX ADMIN — LIDOCAINE HYDROCHLORIDE 50 MG: 10 INJECTION, SOLUTION EPIDURAL; INFILTRATION; INTRACAUDAL; PERINEURAL at 12:47:00

## 2025-05-19 RX ADMIN — PHENYLEPHRINE HCL 100 MCG: 10 MG/ML VIAL (ML) INJECTION at 13:00:00

## 2025-05-19 RX ADMIN — PHENYLEPHRINE HCL 100 MCG: 10 MG/ML VIAL (ML) INJECTION at 14:10:00

## 2025-05-19 RX ADMIN — PHENYLEPHRINE HCL 100 MCG: 10 MG/ML VIAL (ML) INJECTION at 13:07:00

## 2025-05-19 RX ADMIN — TRANEXAMIC ACID 1000 MG: 10 INJECTION, SOLUTION INTRAVENOUS at 13:00:00

## 2025-05-19 RX ADMIN — EPHEDRINE SULFATE 10 MG: 50 INJECTION INTRAVENOUS at 14:26:00

## 2025-05-19 RX ADMIN — PHENYLEPHRINE HCL 100 MCG: 10 MG/ML VIAL (ML) INJECTION at 12:58:00

## 2025-05-19 RX ADMIN — DEXAMETHASONE SODIUM PHOSPHATE 8 MG: 4 MG/ML VIAL (ML) INJECTION at 13:00:00

## 2025-05-19 RX ADMIN — PHENYLEPHRINE HCL 100 MCG: 10 MG/ML VIAL (ML) INJECTION at 12:47:00

## 2025-05-19 NOTE — ANESTHESIA PROCEDURE NOTES
Airway  Date/Time: 5/19/2025 12:49 PM  Reason: elective      General Information and Staff   Patient location during procedure: OR  Anesthesiologist: Rebel Low MD  Performed: anesthesiologist   Performed by: Rebel Low MD  Authorized by: Rebel Low MD        Indications and Patient Condition  Indications for airway management: anesthesia  Sedation level: deep      Preoxygenated: yesPatient position: sniffing    Mask difficulty assessment: 2 - vent by mask + OA or adjuvant +/- NMBA    Final Airway Details    Final airway type: endotracheal airway    Successful airway: ETT  Cuffed: yes   Successful intubation technique: direct laryngoscopy  Endotracheal tube insertion site: oral  Blade: Terri  Blade size: #3  ETT size (mm): 7.0    Cormack-Lehane Classification: grade I - full view of glottis  Placement verified by: capnometry   Measured from: lips  ETT to lips (cm): 22  Number of attempts at approach: 1

## 2025-05-19 NOTE — OPERATIVE REPORT
OPERATIVE REPORT    Facility:  [facility]    Patient Name:  Ruth Youssef    Age/Gender:  85 year old female  :  1940    MRN:  [id number]    Date of Operation:  2025    Preoperative Diagnosis:   LEFT HIP OSTEOARTHRITIS    Postoperative Diagnosis:   LEFT HIP OSTEOARTHRITIS    Procedure Performed:  LEFT TOTAL HIP ARTHROPLASTY    Surgeon:  ARLEN ALLAN M.D.    Assistant:   Hermelindo Gutiérrez, MSN, FNP-BC       Anesthesia:  EPIDURAL    Estimated Blood Loss:  150cc    Drain:  NONE    Complications:  NONE    Implants:   1.  Biomet G7 acetabular shell, size 50 mm   2.  Neutral E1 highly cross-linked polyethylene liner   3.  Jesusita Avenir cemented femoral stem, size 3, std offset   4.  36 mm, -3.5 Delta ceramic femoral head      Indications for Procedure:  Ruth Youssef is a 85 year old female with osteoarthritis of the left hip who failed conservative management.  After consultation in the office and discussion regarding risks and benefits of surgical treatment, surgery was scheduled and informed consent obtained.      Description of Procedure:  The patient was taken to the operating room after the correct surgical site was marked in the preop holding area.  The patient was placed under anesthesia and placed in a supine position on the [facility] orthopaedic table.  Preoperative antibiotics were given.  All bony prominences were padded.  The left hip was prepped and draped in usual, sterile surgical fashion.  Bony landmarks were palpated for incision and a direct anterior approach was performed to the hip between the interval of tensor fascia marvin and sartorius/rectus femoris.  Lateral femoral circumflex vessels were identified, isolated and cauterized.  An L-shaped capsulotomy was performed and the capsule was tagged for retraction.  Retractors were placed inside the hip capsule and further capsular release was performed inside the saddle of the femur as well as down the medial aspect of the  femoral neck.  Osteotomy of the femoral neck was performed using a sagittal saw.  A corkscrew was used to remove the femoral head.  Retractors were placed anterior and posterior to the acetabulum.  The hip labrum as well as the soft tissue in the cotyloid fossa were removed in their entirety.  Reaming was then performed using fluoroscopy to assess depth, anteversion and abduction angle.  The acetabular shell was then inserted again using fluoroscopy to assess abduction angle, anteversion and complete seating of the acetabular component.  A neutral polyethylene was then inserted and impacted without difficulty.    Attention was then turned to the femur where the femoral hook was placed around the femur just distal to the vastus tubercle and proximal to the tendon of the gluteus armond.  The leg was then externally rotated, extended and adducted with traction completely released.  The mechanical lift arm on the table was used to hold the proximal femur carefully.  Capsule was then further released inside the trochanter until the entire inside of the greater trochanter was easily visualized.  The lateral femoral neck remnant was removed and the femur was broached.  After broaching up to a proper size, trial head and neck components were used to determine stem offset and head length.  Fluoroscopy was used to verify the position of the broach as well as the length and offset to determine final implant positions. The trials were removed and the canal was prepared for cement.  A cement restrictor was placed in the canal at an appropriate depth and cement was mixed and loaded into the cement gun.  The canal was irrigated and suctioned and filled with cement.  The stem was inserted and cement allowed to dry.  The head was impacted onto the stem.  There were no complications with insertion of the femoral components.  The hip was then reduced under direct visualization.  Fluoroscopy again verified length, offset, implant  position and stem fill of the femoral canal.  Fluoro also verified there were no iatrogenic fractures.  The wound was copiously irrigated and the tag sutures in the capsule were tied together closing the anterior hip capsule.  Local anesthetic and pain medicine was injected into the hip capsule and surrounding soft tissues.  The fascia marvin was closed with absorbable suture.  Skin was then closed in 2 layers with absorbable suture.  Dermabond was applied to the wound and allowed to dry before sterile dressings were applied.  There were no complications and the procedure went as planned.  The surgical assistant was present for the entire procedure and assisted with the approach, exposure, definitive surgery and closure.    The patient left the operating room in stable condition.      ARLEN ALLAN M.D.

## 2025-05-19 NOTE — PLAN OF CARE
NURSING ADMISSION NOTE      Patient admitted via Cart  Oriented to room.  Safety precautions initiated.  Bed in low position.  Call light in reach.  Patient is A & O x4, continent. IVF infusing per MD order. Pain 5/10. Ice wrap in place.

## 2025-05-19 NOTE — INTERVAL H&P NOTE
Pre-op Diagnosis: M16.12 PRIMARY OSTEOARTHRITIS LEFT HIP    The above referenced H&P was reviewed by Suraj Bailon MD on 5/19/2025, the patient was examined and no significant changes have occurred in the patient's condition since the H&P was performed.  I discussed with the patient and/or legal representative the potential benefits, risks and side effects of this procedure; the likelihood of the patient achieving goals; and potential problems that might occur during recuperation.  I discussed reasonable alternatives to the procedure, including risks, benefits and side effects related to the alternatives and risks related to not receiving this procedure.  We will proceed with procedure as planned.

## 2025-05-19 NOTE — ANESTHESIA PREPROCEDURE EVALUATION
PRE-OP EVALUATION    Patient Name: Ruth Youssef    Admit Diagnosis: M16.12 PRIMARY OSTEOARTHRITIS LEFT HIP    Pre-op Diagnosis: M16.12 PRIMARY OSTEOARTHRITIS LEFT HIP    LEFT ANTERIOR TOTAL HIP ARTHROPLASTY    Anesthesia Procedure: LEFT ANTERIOR TOTAL HIP ARTHROPLASTY (Left)    Surgeons and Role:     * Suraj Bailon MD - Primary    Pre-op vitals reviewed.  Temp: 97.6 °F (36.4 °C)  Pulse: 64  Resp: 16  BP: 134/58  SpO2: 99 %  Body mass index is 36.91 kg/m².    Current medications reviewed.  Hospital Medications:  Current Medications[1]    Outpatient Medications:   Prescriptions Prior to Admission[2]    Allergies: Patient has no known allergies.      Anesthesia Evaluation        Anesthetic Complications  (-) history of anesthetic complications         GI/Hepatic/Renal      (-) GERD       (-) chronic renal disease   (-) liver disease                 Cardiovascular        Exercise tolerance: good     MET: >4      (+) hypertension                     (+) CHF                Endo/Other      (-) diabetes     (-) hypothyroidism  (-) hyperthyroidism                     Pulmonary      (-) asthma  (-) COPD     (-) pneumonia    (-) recent URI   (+) sleep apnea and no treatment      Neuro/Psych          (+) CVA and no residual deficits    (-) seizures                       Past Surgical History[3]  Social Hx on file[4]  History   Drug Use No     Available pre-op labs reviewed.     Lab Results   Component Value Date     (L) 04/28/2025    K 4.1 04/28/2025    CL 98 04/28/2025    CO2 28.0 04/28/2025            Airway      Mallampati: III  Mouth opening: 3 FB  TM distance: < 4 cm  Neck ROM: full Cardiovascular    Cardiovascular exam normal.         Dental    Dentition appears grossly intact         Pulmonary    Pulmonary exam normal.                 Other findings              ASA: 3   Plan: spinal  NPO status verified and patient meets guidelines.    Post-procedure pain management plan discussed with surgeon and  patient.      Plan/risks discussed with: patient                Present on Admission:  **None**             [1]    [Transfer Hold] acetaminophen (Tylenol Extra Strength) tab 1,000 mg  1,000 mg Oral Once    lactated ringers infusion   Intravenous Continuous    [Transfer Hold] tranexamic acid in sodium chloride 0.7% (Cyklokapron) 1000 mg/100mL infusion premix 1,000 mg  1,000 mg Intravenous Once    ceFAZolin (Ancef) 2g in 10mL IV syringe premix  2 g Intravenous Once   [2]   Medications Prior to Admission   Medication Sig Dispense Refill Last Dose/Taking    carvedilol 6.25 MG Oral Tab Take 1 tablet (6.25 mg total) by mouth 2 (two) times daily with meals.   5/19/2025 at  8:00 AM    bumetanide 1 MG Oral Tab Take 1 tablet (1 mg total) by mouth daily.   5/19/2025 at  8:00 AM    Irbesartan 150 MG Oral Tab Take 1 tablet (150 mg total) by mouth daily.   5/19/2025 at  8:00 AM    pantoprazole 40 MG Oral Tab EC Take 1 tablet (40 mg total) by mouth every morning before breakfast.   5/19/2025 at  8:00 AM    potassium chloride 10 MEQ Oral Tab CR Take 1 tablet (10 mEq total) by mouth 2 (two) times daily.   5/19/2025 at  8:00 AM    rosuvastatin 5 MG Oral Tab Take 1 tablet (5 mg total) by mouth every morning.   5/19/2025 at  8:00 AM    aspirin 81 MG Oral Tab EC Take 1 tablet (81 mg total) by mouth in the morning and 1 tablet (81 mg total) before bedtime. Take 1 tablet (81 mg total) by mouth 2 (two) times daily. To take for 6 weeks total for blood clot prevention.   5/19/2025 at  8:00 AM    levothyroxine 25 MCG Oral Tab Take 1 tablet (25 mcg total) by mouth before breakfast. 90 tablet 3 5/19/2025 at  8:00 AM    Multiple Vitamins-Minerals (MULTIVITAMIN ADULT OR) Take 1 tablet by mouth in the morning.   5/19/2025 at  8:00 AM    oxyCODONE 5 MG Oral Tab Take 0.5-1 tablets (2.5-5 mg total) by mouth every 4 (four) hours as needed for Pain. Post op       traMADol 50 MG Oral Tab Take 1 tablet (50 mg total) by mouth every 4 to 6 hours as needed  for Pain. Post op       pregabalin 75 MG Oral Cap Take 1 capsule (75 mg total) by mouth daily. Post op       sennosides-docusate 8.6-50 MG Oral Tab Take 1 tablet by mouth daily. Post op       acetaminophen 500 MG Oral Tab Take 2 tablets (1,000 mg total) by mouth every 8 (eight) hours. Post op      [3]   Past Surgical History:  Procedure Laterality Date    Chemotherapy  2004    Cholecystectomy      Colonoscopy  12/14/12 - YANIRA Pemberton    ischemic cecal inflammatory mass, diverticuli, hemorrhoids.    Colonoscopy,remv lesn,snare  5/7/2013    YANIRA Pemberton. diverticulosis, hemorrhoids, normal cecum. no repeat rec'd    Correct bunion,metatarsal osteotomy  2/3/2011    Performed by ABDIEL SARMIENTO at [facility]    Excisional biospy right  2018    Hip replacement surgery Right 04/01/2021    Hx breast cancer  9-04    Lumpectomy left  9-04    Needle biopsy right  2017    Other surgical history      L & R Knee surgery- Torn miniscus    Other surgical history      palate expansion    Other surgical history      lumpectomy    Other surgical history  05/22/2018    Cysto- DrBarb Betty    Radiation left  10-04    Repair of hammertoe,one  2/3/2011    Performed by ABDIEL SARMIENTO at [facility]    Repair umbilical irma,5+y/o,reduc N/A 6/5/2014    Procedure: UMBILICAL HERNIORRAPHY;  Surgeon: Hawa Pemberton MD;  Location: [facility]    Skin surgery  02/14/2012    BCC nodular / L infraorbital cheek / Mohs surgery by Dr. Tye Hernandez    Skin surgery  03/21/17    MMS done for SCC mod well differentiated to right mid cheek done by AB    Skin surgery  11/08/2018    MMS, left temple- SCC,     Skin surgery  03/11/2019    MOHS, BCC - right medial canthus,     Skin surgery  04/03/2018    mohs, BCC - nasal tip,    [4]   Social History  Socioeconomic History    Marital status:    Tobacco Use    Smoking status: Never    Smokeless tobacco: Never   Vaping Use    Vaping status:  Never Used   Substance and Sexual Activity    Alcohol use: Yes     Alcohol/week: 0.0 - 2.0 standard drinks of alcohol    Drug use: No   Other Topics Concern    Seat Belt Yes

## 2025-05-19 NOTE — CM/SW NOTE
05/19/25 1000   CM/SW Referral Data   Referral Source Physician   Reason for Referral Discharge planning   Informant EMR;Clinical Staff Member   Discharge Needs   Anticipated D/C needs Home health care   Choice of Post-Acute Provider   Patient/family choice [facility]     Pt is a 85 year old female admitted for L Ant LYNNE.  She was set up with [facility] pre-operatively    / to remain available for support and/or discharge planning.     Beckie Quispe MBA MSN, RN Case Manager  [phone]

## 2025-05-19 NOTE — ANESTHESIA POSTPROCEDURE EVALUATION
[facility]    Ruth Youssef Patient Status:  Outpatient in a Bed   Age/Gender 85 year old female MRN [id number]   Location [facility] POST ANESTHESIA CARE UNIT Attending Suraj Bailon MD   Hosp Day # 0 PCP Shakira Falcon MD       Anesthesia Post-op Note    LEFT ANTERIOR TOTAL HIP ARTHROPLASTY    Procedure Summary       Date: 05/19/25 Room / Location:  MAIN OR 14 /  MAIN OR    Anesthesia Start: 1228 Anesthesia Stop: 1453    Procedure: LEFT ANTERIOR TOTAL HIP ARTHROPLASTY (Left: Hip) Diagnosis: (M16.12 PRIMARY OSTEOARTHRITIS LEFT HIP)    Surgeons: Suraj Bailon MD Anesthesiologist: Rebel Low MD    Anesthesia Type: general ASA Status: 3            Anesthesia Type: general    Vitals Value Taken Time   /58 05/19/25 14:54   Temp 98 05/19/25 14:54   Pulse 65 05/19/25 14:54   Resp 12 05/19/25 14:54   SpO2 92 05/19/25 14:54           Patient Location: PACU    Anesthesia Type: general    Airway Patency: patent and extubated    Postop Pain Control: adequate    Mental Status: preanesthetic baseline    Nausea/Vomiting: none    Cardiopulmonary/Hydration status: stable euvolemic    Complications: no apparent anesthesia related complications    Postop vital signs: stable    Dental Exam: Unchanged from Preop    Patient to be discharged from PACU when criteria met.

## 2025-05-20 VITALS
HEART RATE: 90 BPM | OXYGEN SATURATION: 93 % | SYSTOLIC BLOOD PRESSURE: 145 MMHG | WEIGHT: 195.31 LBS | RESPIRATION RATE: 16 BRPM | BODY MASS INDEX: 36.87 KG/M2 | HEIGHT: 61 IN | DIASTOLIC BLOOD PRESSURE: 85 MMHG | TEMPERATURE: 98 F

## 2025-05-20 PROBLEM — Z01.818 PRE-OP TESTING: Status: ACTIVE | Noted: 2025-05-20

## 2025-05-20 PROCEDURE — 94760 N-INVAS EAR/PLS OXIMETRY 1: CPT

## 2025-05-20 PROCEDURE — 97530 THERAPEUTIC ACTIVITIES: CPT

## 2025-05-20 PROCEDURE — 97161 PT EVAL LOW COMPLEX 20 MIN: CPT

## 2025-05-20 PROCEDURE — 97116 GAIT TRAINING THERAPY: CPT

## 2025-05-20 PROCEDURE — 97535 SELF CARE MNGMENT TRAINING: CPT

## 2025-05-20 PROCEDURE — 97165 OT EVAL LOW COMPLEX 30 MIN: CPT

## 2025-05-20 RX ORDER — OXYCODONE HYDROCHLORIDE 10 MG/1
10 TABLET ORAL EVERY 4 HOURS PRN
Status: DISCONTINUED | OUTPATIENT
Start: 2025-05-20 | End: 2025-05-20

## 2025-05-20 RX ORDER — OXYCODONE HYDROCHLORIDE 5 MG/1
5 TABLET ORAL EVERY 4 HOURS PRN
Status: DISCONTINUED | OUTPATIENT
Start: 2025-05-20 | End: 2025-05-20

## 2025-05-20 RX ORDER — OXYCODONE HYDROCHLORIDE 15 MG/1
15 TABLET ORAL EVERY 4 HOURS PRN
Status: DISCONTINUED | OUTPATIENT
Start: 2025-05-20 | End: 2025-05-20

## 2025-05-20 NOTE — CONSULTS
[facility]   [facility]    Medical Consult     Ruth Youssef Patient Status:  Outpatient in a Bed    1940 MRN [id number]   Location [facility] 3SW-A Attending Suraj Bailon MD   Hosp Day # 0 PCP Shakira Falcon MD     Chief Complaint: Left hip osteoarthritis    History of Present Illness: Ruth Youssef is a 85 year old female with history of anxiety, depression, breast cancer, bronchiectasis, congestive heart failure, GERD, fibrosis of the lungs seen on imaging, history of PVCs, hypertension, hyperlipidemia, obstructive sleep apnea, neuropathy, osteoarthritis history of stroke seen on imaging, varicose veins presenting with left hip osteoarthritis status post left total hip arthroplasty.  Patient denies any preoperative positive review of systems.  Patient denies any acute issues.  Patient's pain is controlled.    Past Medical History:Past Medical History[1]     Past Surgical History: Past Surgical History[2]    Social History:  reports that she has never smoked. She has never used smokeless tobacco. She reports current alcohol use. She reports that she does not use drugs.no illegal drugs, , 3 children, use a walker recently, live with     Family History: Family History[3]  Mother passed  Father passed    Allergies: Allergies[4]    Medications:  Medications Ordered Prior to Encounter[5]    Review of Systems:   A comprehensive 14 point review of systems was completed.    Pertinent positives and negatives noted in the HPI.    Physical Exam:    /73 (BP Location: Right arm)   Pulse 89   Temp 97.6 °F (36.4 °C) (Oral)   Resp 14   Ht 5' 1\" (1.549 m)   Wt 195 lb 5.2 oz (88.6 kg)   LMP 1989 (Approximate)   SpO2 93%   BMI 36.91 kg/m²   General: No acute distress. Alert and oriented x 3.  HEENT: Normocephalic atraumatic. Moist mucous membranes. EOM-I.   Neck: No JVD. No carotid bruits.  Respiratory: Clear to auscultation bilaterally. No  wheezes. No crackles  Cardiovascular: S1, S2. Regular rate and rhythm. No murmurs  Chest and Back: No tenderness or deformity.  Abdomen: Soft, nontender, nondistended.  Positive bowel sounds. No rebound, guarding   Neurologic: No focal neurological deficits. CNII-XII grossly intact. Sensation and strength intact  Musculoskeletal: Moves all extremities.  Extremities: No significant pitting edema or tenderness of the LE  Integument: No new rashes or lesions.   Psychiatric: Appropriate mood and affect.      Diagnostic Data:      Labs:  No results for input(s): \"WBC\", \"HGB\", \"MCV\", \"PLT\", \"BAND\", \"INR\" in the last 168 hours.    Invalid input(s): \"LYM#\", \"MONO#\", \"BASOS#\", \"EOSIN#\"    No results for input(s): \"GLU\", \"BUN\", \"CREATSERUM\", \"GFRAA\", \"GFRNAA\", \"CA\", \"ALB\", \"NA\", \"K\", \"CL\", \"CO2\", \"ALKPHO\", \"AST\", \"ALT\", \"BILT\", \"TP\" in the last 168 hours.    CrCl cannot be calculated (Patient's most recent lab result is older than the maximum 7 days allowed.).    No results for input(s): \"PTP\", \"INR\" in the last 168 hours.    No results for input(s): \"TROP\", \"CK\" in the last 168 hours.    Imaging: Imaging data reviewed in Epic.      ASSESSMENT / PLAN:    85 year old female with history of anxiety, depression, breast cancer, bronchiectasis, congestive heart failure, GERD, fibrosis of the lungs seen on imaging, history of PVCs, hypertension, hyperlipidemia, obstructive sleep apnea, neuropathy, osteoarthritis history of stroke seen on imaging, varicose veins presenting with left hip osteoarthritis status post left total hip arthroplasty.      Left hip osteoarthritis  -POD # 0 status post left total hip arthroplasty.    -ortho following  -PT/OT    Post Operative Pain  -acetaminophen po atc  -dexamethasone iv x 1  -tramadol po atc  -hydromorphone iv prn  -oxy po prn     HTN  -sbp stable  -carvedilol  -losartan    HLD  -rosuvastatin     Hypothyroidism  -levothyroxine    HF hx  -bumex  -losartan    GERD  -famotidine    Hx  CVA  -asa  -rosuvastatin     Quality:  DVT Prophylaxis: asa, scd  CODE status: Full per chart  Ocampo:     Plan of care discussed with patient and staff    Dispo: no discharge    Garret Dasilva MD  [facility]  [phone]                           [1]   Past Medical History:   AK (actinic keratosis)    chest    Anxiety, generalized    Aortic atherosclerosis    Aorto-iliac atherosclerosis    1/30/19 CT    Basal cell carcinoma    Breast CA (HCC)    Breast cancer (HCC)    Bronchiectasis without complication (HCC)    Seen on CT chest    Calculus of gallbladder    Cataract    Congestive heart disease (HCC)    Depression    Dry eye    Ductal hyperplasia of breast    Right side, 2017    Esophageal reflux    Fibrosis lung (HCC)    Seen at [facility], on CT scan, mild    Frequent PVCs    Gastroesophageal reflux disease without esophagitis- seen on esophogram     Glucose intolerance (impaired glucose tolerance)    Hearing impairment    radha hearing aids    Hearing loss    High blood pressure    High cholesterol    History of breast cancer    Hypertension    Incomplete right bundle branch block    Left axis deviation    Left sided lacunar infarction (HCC), seen on MRI    Localized adiposity of neck    MALIG NEOPLASM SKIN EYELID    Neuropathy due to chemotherapeutic drug (HCC)    Obstructive apnea    [facility] PSG AHI 48    Osteoarthritis    Osteoarthritis, hip, bilateral    seen on CT pelvis, moderate    Peripheral neuropathy    Personal history of antineoplastic chemotherapy    2004    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  Noted on CT 12/12/12.     Severe obesity (BMI 35.0-39.9)    Chart review.     Sleep apnea    No device or treatment    Small vessel disease    Brain, MRI    SNHL (sensorineural hearing loss)    Squamous cell carcinoma in situ of skin of face    Stroke (HCC)    possible \"small stroke\" per patient, date unknown    Subclinical hypothyroidism    Thyroid cyst     Thyroid nodule    Varicose veins of legs    Varicose veins of lower extremities with other complications    Visual impairment    Wears hearing aid   [2]   Past Surgical History:  Procedure Laterality Date    Chemotherapy  2004    Cholecystectomy      Colonoscopy  12/14/12 - YANIRA Pemberton    ischemic cecal inflammatory mass, diverticuli, hemorrhoids.    Colonoscopy,remv lesjonathan,snare  5/7/2013    YANIRA Pemberton. diverticulosis, hemorrhoids, normal cecum. no repeat rec'd    Correct bunion,metatarsal osteotomy  2/3/2011    Performed by ABDIEL SARMIENTO at [facility]    Excisional biospy right  2018    Hip replacement surgery Right 04/01/2021    Hx breast cancer  9-04    Lumpectomy left  9-04    Needle biopsy right  2017    Other surgical history      L & R Knee surgery- Torn miniscus    Other surgical history      palate expansion    Other surgical history      lumpectomy    Other surgical history  05/22/2018    Cysto- DrBarb Betty    Radiation left  10-04    Repair of hammertoe,one  2/3/2011    Performed by ABDIEL SARMIENTO at [facility]    Repair umbilical irma,5+y/o,reduc N/A 6/5/2014    Procedure: UMBILICAL HERNIORRAPHY;  Surgeon: Hawa Pemberton MD;  Location: [facility]    Skin surgery  02/14/2012    BCC nodular / L infraorbital cheek / Mohs surgery by Dr. Tye Hernandez    Skin surgery  03/21/17    MMS done for SCC mod well differentiated to right mid cheek done by AB    Skin surgery  11/08/2018    MMS, left temple- SCC,     Skin surgery  03/11/2019    MOHS, BCC - right medial canthus,     Skin surgery  04/03/2018    mohs, BCC - nasal tip,    [3]   Family History  Problem Relation Age of Onset    Other (arthritis) Father     Breast Cancer Paternal Grandmother 75        Age at dx 75    Diabetes Maternal Grandmother     Heart Disorder Brother    [4] No Known Allergies  [5]   No current facility-administered medications on file prior to encounter.     Current  Outpatient Medications on File Prior to Encounter   Medication Sig Dispense Refill    carvedilol 6.25 MG Oral Tab Take 1 tablet (6.25 mg total) by mouth 2 (two) times daily with meals.      bumetanide 1 MG Oral Tab Take 1 tablet (1 mg total) by mouth daily.      Irbesartan 150 MG Oral Tab Take 1 tablet (150 mg total) by mouth daily.      pantoprazole 40 MG Oral Tab EC Take 1 tablet (40 mg total) by mouth every morning before breakfast.      potassium chloride 10 MEQ Oral Tab CR Take 1 tablet (10 mEq total) by mouth 2 (two) times daily.      rosuvastatin 5 MG Oral Tab Take 1 tablet (5 mg total) by mouth every morning.      aspirin 81 MG Oral Tab EC Take 1 tablet (81 mg total) by mouth in the morning and 1 tablet (81 mg total) before bedtime. Take 1 tablet (81 mg total) by mouth 2 (two) times daily. To take for 6 weeks total for blood clot prevention.      levothyroxine 25 MCG Oral Tab Take 1 tablet (25 mcg total) by mouth before breakfast. 90 tablet 3    Multiple Vitamins-Minerals (MULTIVITAMIN ADULT OR) Take 1 tablet by mouth in the morning.      oxyCODONE 5 MG Oral Tab Take 0.5-1 tablets (2.5-5 mg total) by mouth every 4 (four) hours as needed for Pain. Post op      traMADol 50 MG Oral Tab Take 1 tablet (50 mg total) by mouth every 4 to 6 hours as needed for Pain. Post op      pregabalin 75 MG Oral Cap Take 1 capsule (75 mg total) by mouth daily. Post op      sennosides-docusate 8.6-50 MG Oral Tab Take 1 tablet by mouth daily. Post op      acetaminophen 500 MG Oral Tab Take 2 tablets (1,000 mg total) by mouth every 8 (eight) hours. Post op

## 2025-05-20 NOTE — PROGRESS NOTES
[facility] Progress Note                                                                     [facility]   part of [facility]      Ruth IrahetaBacilio  5/11/1940    SUBJECTIVE: no chest pain, palpitations, shortness of breath, cough, nausea, vomiting, abdominal pain. Post Operative Pain controlled. Pt ambulating    OBJECTIVE:  Temp:  [97 °F (36.1 °C)-98.2 °F (36.8 °C)] 97 °F (36.1 °C)  Pulse:  [] 110  Resp:  [9-23] 17  BP: (108-166)/(50-99) 132/82  SpO2:  [87 %-96 %] 93 %  Exam  Gen: No acute distress, alert and oriented   Pulm: Lungs clear bilaterally, normal respiratory effort, no crackles, no wheezing  CV: Heart with regular rate and rhythm, no murmur.   Abd: Abdomen soft, nontender, nondistended, bowel sounds present  MSK:No significant pitting edema or tenderness of the LE  Skin: no new rashes or lesions    Labs:   No results for input(s): \"WBC\", \"HGB\", \"MCV\", \"PLT\", \"BAND\", \"INR\" in the last 168 hours.    Invalid input(s): \"LYM#\", \"MONO#\", \"BASOS#\", \"EOSIN#\"    No results for input(s): \"NA\", \"K\", \"CL\", \"CO2\", \"BUN\", \"CREATSERUM\", \"CA\", \"CAION\", \"MG\", \"PHOS\", \"GLU\" in the last 168 hours.    No results for input(s): \"ALT\", \"AST\", \"ALB\", \"AMYLASE\", \"LIPASE\", \"LDH\" in the last 168 hours.    Invalid input(s): \"ALPHOS\", \"TBIL\", \"DBIL\", \"TPROT\"    No results for input(s): \"PGLU\" in the last 168 hours.    Meds:   Scheduled: Scheduled Medications[1]  Continuous Infusions: Medication Infusions[2]  PRN: PRN Medications[3]    ASSESSMENT / PLAN:    85 year old female with history of anxiety, depression, breast cancer, bronchiectasis, congestive heart failure, GERD, fibrosis of the lungs seen on imaging, history of PVCs, hypertension, hyperlipidemia, obstructive sleep apnea, neuropathy, osteoarthritis history of stroke seen on imaging, varicose veins presenting with left hip osteoarthritis status post left total hip arthroplasty.       Left hip  osteoarthritis  -POD # 1 status post left total hip arthroplasty.    -ortho following--> ok for dc  -PT/OT--> home with PT     Post Operative Pain  -acetaminophen po atc  -dexamethasone iv x 1  -tramadol po atc  -hydromorphone iv prn  -oxy po prn   -dc pain meds per ortho     HTN  -sbp stable  -carvedilol  -losartan     HLD  -rosuvastatin      Hypothyroidism  -levothyroxine     HF hx  -bumex  -losartan     GERD  -famotidine     Hx CVA  -asa  -rosuvastatin      Quality:  DVT Prophylaxis: asa, scd  CODE status: Full per chart  Ocampo:      Plan of care discussed with patient and staff     Dispo: medically stable for discharge     Garret Dasilva MD  [facility]  [phone]           [1]    sennosides  17.2 mg Oral Nightly    docusate sodium  100 mg Oral BID    famotidine  20 mg Oral BID    Or    famotidine  20 mg Intravenous BID    ferrous sulfate  325 mg Oral Daily with breakfast    aspirin  81 mg Oral BID    acetaminophen  1,000 mg Oral Q8H PATRICE    traMADol  50 mg Oral 4 times per day    bumetanide  1 mg Oral Daily    carvedilol  6.25 mg Oral BID with meals    losartan  50 mg Oral Daily    levothyroxine  25 mcg Oral Before breakfast    rosuvastatin  5 mg Oral QAM   [2]    lactated ringers Stopped (05/20/25 0300)   [3]   oxyCODONE **OR** oxyCODONE **OR** oxyCODONE    sodium chloride    polyethylene glycol (PEG 3350)    magnesium hydroxide    bisacodyl    fleet enema    ondansetron    metoclopramide    diphenhydrAMINE **OR** diphenhydrAMINE    tiZANidine    HYDROmorphone **OR** HYDROmorphone

## 2025-05-20 NOTE — PHYSICAL THERAPY NOTE
PHYSICAL THERAPY EVALUATION - INPATIENT     Room Number: 376/376-A  Evaluation Date: 5/20/2025  Type of Evaluation: Initial  Physician Order: PT Eval and Treat    Presenting Problem: L LYNNE  Co-Morbidities : H/o CVA, R LYNNE 2021, depression, anxiety, [place], HTN, neuropathy  Reason for Therapy: Mobility Dysfunction and Discharge Planning    PHYSICAL THERAPY ASSESSMENT   Patient is a 85 year old female admitted 5/19/2025 for L LYNNE.   Patient is currently functioning requiring supervision to contact guard assist for transfers, ambulation and stair mobility. Prior to admission, patient's baseline is modified independent ambulation in the home with support from walls and furniture and use of a cane or rollator for community.    Patient will benefit from continued skilled PT Services at discharge to promote prior level of function and safety with additional support and return home with home health PT.    PLAN  Patient has been evaluated and presents with no skilled Physical Therapy needs at this time.  Patient discharged from Physical Therapy services.  Please re-order if a new functional limitation presents during this admission.    PT Device Recommendation: Rolling walker    GOALS  Patient was able to achieve the following goals ...    Patient was able to transfer With SBA   Patient able to ambulate on level surfaces With SBA     HOME SITUATION  Type of Home: House  Home Layout: Multi-level  Stairs to Enter : 1   Railing: No    Stairs to Bedroom: 5    Railing: Yes    Lives With: Spouse    Drives: Yes   Patient Regularly Uses: Cane, Rollator, Hearing aides     Prior Level of [place]: The pt reports she typically ambulates in her home sans device, but will use wall or furniture for support.  Pt has a cane and rollator for community ambulation.    SUBJECTIVE  \"I didn't sleep well last night.\"    OBJECTIVE  Precautions: Bed/chair alarm, Hard of hearing, Limb alert - left  Fall Risk: High fall risk    WEIGHT BEARING  RESTRICTION  L Lower Extremity: Weight Bearing as Tolerated    PAIN ASSESSMENT  Rating:  (denies pain, \"discomfort\")  Location: L hip  Management Techniques: Activity promotion, Repositioning    COGNITION  Overall Cognitive Status:  WFL - within functional limits  Orientation Level:  oriented x4  Following Commands:  follows all commands and directions without difficulty    RANGE OF MOTION AND STRENGTH ASSESSMENT  Upper extremity ROM and strength are within functional limits     Lower extremity ROM is within functional limits     Lower extremity strength is within functional limits except for the following:    Left Hip flexion  3+/5  Left Knee extension  4-/5    BALANCE  Static Sitting: Good  Dynamic Sitting: Fair +  Static Standing: Fair -  Dynamic Standing: Poor +      ACTIVITY TOLERANCE  Pulse: 110                 Patient Position: Standing (ambulation)    O2 WALK  Room air, no shortness of breath    AM-PAC '6-Clicks' INPATIENT SHORT FORM - BASIC MOBILITY  How much difficulty does the patient currently have...  Patient Difficulty: Turning over in bed (including adjusting bedclothes, sheets and blankets)?: A Little   Patient Difficulty: Sitting down on and standing up from a chair with arms (e.g., wheelchair, bedside commode, etc.): A Little   Patient Difficulty: Moving from lying on back to sitting on the side of the bed?: A Little   How much help from another person does the patient currently need...   Help from Another: Moving to and from a bed to a chair (including a wheelchair)?: A Little   Help from Another: Need to walk in hospital room?: A Little   Help from Another: Climbing 3-5 steps with a railing?: A Little       AM-PAC Score:  Raw Score: 18   Approx Degree of Impairment: 46.58%   Standardized Score (AM-PAC Scale): 43.63   CMS Modifier (G-Code): CK    FUNCTIONAL ABILITY STATUS  Gait Assessment   Functional Mobility/Gait Assessment  Gait Assistance:  (CGA progressing to SBA)  Distance (ft): 150  x2  Assistive Device: Rolling walker  Pattern: L Decreased stance time (step through gait pattern)  Stairs: Stoop/curb, Stairs  How Many Stairs: 5x2  Device:  (2 railings for first 5 stairs, 1 railing for second rep of 5 stairs)  Assist: Contact guard assist  Pattern: Ascend and Descend  Ascend and Descend : Step to  Stoop/Curb: Ascended/descended 8\" stoop step with use of a RW and CGA using a step-to pattern    Skilled Therapy Provided     Transfer Mobility:  Sit to stand: CGA progressing to SBA with verbal cues for hand placement and sequencing   Stand to sit: CGA progressing to SBA with verbal cues for hand placement and sequencing  Gait = CGA progressing to SBA with verbal cues for step through gait and RW management    Therapist's comments: Pt educated on stair step sequencing and car transfer technique.    Exercise/Education Provided:  Functional activity tolerated  Gait training  Strengthening  Transfer training    Patient End of Session: Up in chair, Needs met, Call light within reach, RN aware of session/findings, All patient questions and concerns addressed, Hospital anti-slip socks, Ice applied, SCDs in place    Patient Evaluation Complexity Level:  History High - 3 or more personal factors and/or co-morbidities   Examination of body systems Moderate - addressing a total of 3 or more elements   Clinical Presentation Low- Stable   Clinical Decision Making Low Complexity       PT Session Time: 45 minutes  Gait Trainin minutes

## 2025-05-20 NOTE — PROGRESS NOTES
AVS reviewed, IV removed , discharge video viewed, will dc home w/ [facility], verbalized understanding of dc instructions along w/ spouse who is present at bedside.

## 2025-05-20 NOTE — PLAN OF CARE
A&Ox4. VSS on RA. /IS. Tele. SCDs, ankle pumps encouraged. Tolerating diet, last BM 5/18. Voiding freely. Pain mananged with current medications. Dressing to L hip C/D/I. Up min w/ walker. PT/OT to see. Patient updated on POC, verbalized agreement. Safety precautions in place, pt instructed to call for assistance, call light within reach.

## 2025-05-20 NOTE — PROGRESS NOTES
Progress Note    Patient: Ruth Youssef  Medical record #: [id number]    Ruth Youssef is a 85 year old  female who is POD #1 left LYNNE.  The patient's main complaint today is surgical pain at the operative site. Denies paresthesias/CP/SOA. Patient has been working with physical therapy.      Hospital Problem List:  Active Problems:    Pre-op testing      Current Medications:  Current Medications[1]      Input/Output:    Intake/Output Summary (Last 24 hours) at 5/20/2025 0843  Last data filed at 5/20/2025 0822  Gross per 24 hour   Intake 1940 ml   Output 750 ml   Net 1190 ml       Vital signs:  Blood pressure (!) 116/99, pulse 96, temperature 97 °F (36.1 °C), temperature source Oral, resp. rate 17, height 5' 1\" (1.549 m), weight 195 lb 5.2 oz (88.6 kg), last menstrual period 02/19/1989, SpO2 93%, not currently breastfeeding.    Physical Exam:     left Leg:  Dressing: clean and dry  Able to dorsiflex ankle and move toes  Sensation grossly intact to light touch throughout  Distal pulses intact  No calf swelling  Russ's sign negative  Compartments soft  No signs or symptoms of DVT or compartment syndrome      Labs:          Assessment: 85 year old  female POD #1 left LYNNE    Plan:    June-op Glucose Goal control <140  TEDs, SCDs, IS  mobilize with PT, WBAT on operative leg  pain controlled with meds  According to CHEST and AAOS guidelines, ASA EC 81 mg po bid for DVT prophylaxis due to bleeding concerns  Disposition: plan discharge today if doing well with PT, consider home vs rehab if needed    Follow up with Dr. Bailon in 2 weeks    Followed by Physician group for medical conditions to include Active Problems:    Pre-op testing        Signed:  Suraj Bailon MD  5/20/2025  8:43 AM         [1]    oxyCODONE immediate release tab 5 mg  5 mg Oral Q4H PRN    Or    oxyCODONE immediate release tab 10 mg  10 mg Oral Q4H PRN    Or    oxyCODONE immediate release tab 15 mg  15 mg Oral Q4H PRN    lactated ringers  infusion   Intravenous Continuous    [COMPLETED] tranexamic acid in sodium chloride 0.7% (Cyklokapron) 1000 mg/100mL infusion premix 1,000 mg  1,000 mg Intravenous Once    [COMPLETED] ceFAZolin (Ancef) 2g in 10mL IV syringe premix  2 g Intravenous Once    sodium chloride 0.9 % IV bolus 500 mL  500 mL Intravenous Once PRN    sennosides (Senokot) tab 17.2 mg  17.2 mg Oral Nightly    docusate sodium (Colace) cap 100 mg  100 mg Oral BID    polyethylene glycol (PEG 3350) (Miralax) 17 g oral packet 17 g  17 g Oral Daily PRN    magnesium hydroxide (Milk of Magnesia) 400 MG/5ML oral suspension 30 mL  30 mL Oral Daily PRN    bisacodyl (Dulcolax) 10 MG rectal suppository 10 mg  10 mg Rectal Daily PRN    fleet enema (Fleet) rectal enema 133 mL  1 enema Rectal Once PRN    ondansetron (Zofran) 4 MG/2ML injection 4 mg  4 mg Intravenous Q6H PRN    metoclopramide (Reglan) 5 mg/mL injection 10 mg  10 mg Intravenous Q8H PRN    famotidine (Pepcid) tab 20 mg  20 mg Oral BID    Or    famotidine (Pepcid) 20 mg/2mL injection 20 mg  20 mg Intravenous BID    [] diphenhydrAMINE (Benadryl) 50 mg/mL  injection 25 mg  25 mg Intravenous Once PRN    diphenhydrAMINE (Benadryl) cap/tab 25 mg  25 mg Oral Q4H PRN    Or    diphenhydrAMINE (Benadryl) 50 mg/mL  injection 12.5 mg  12.5 mg Intravenous Q4H PRN    ferrous sulfate DR tab 325 mg  325 mg Oral Daily with breakfast    aspirin DR tab 81 mg  81 mg Oral BID    [COMPLETED] ceFAZolin (Ancef) 2g in 10mL IV syringe premix  2 g Intravenous Q8H    [COMPLETED] acetaminophen (Tylenol) tab 650 mg  650 mg Oral Once    tiZANidine (Zanaflex) partial tab 1 mg  1 mg Oral Q6H PRN    [COMPLETED] dexAMETHasone PF (Decadron) 10 MG/ML injection 8 mg  8 mg Intravenous Once    acetaminophen (Tylenol Extra Strength) tab 1,000 mg  1,000 mg Oral Q8H PATRICE    traMADol (Ultram) tab 50 mg  50 mg Oral 4 times per day    HYDROmorphone (Dilaudid) 1 MG/ML injection 0.2 mg  0.2 mg Intravenous Q2H PRN    Or    HYDROmorphone  (Dilaudid) 1 MG/ML injection 0.4 mg  0.4 mg Intravenous Q2H PRN    [COMPLETED] acetaminophen (Tylenol) 325 MG tab        bumetanide (Bumex) tab 1 mg  1 mg Oral Daily    carvedilol (Coreg) tab 6.25 mg  6.25 mg Oral BID with meals    losartan (Cozaar) tab 50 mg  50 mg Oral Daily    levothyroxine (Synthroid) tab 25 mcg  25 mcg Oral Before breakfast    rosuvastatin (Crestor) tab 5 mg  5 mg Oral QAM

## 2025-05-20 NOTE — OCCUPATIONAL THERAPY NOTE
OCCUPATIONAL THERAPY EVALUATION - INPATIENT    Room Number: 376/376-A   Evaluation Date: 5/20/2025   Type of Evaluation: Initial  Presenting Problem: 5/19/25 S/p L anterior LYNNE    Physician Order: IP Consult to Occupational Therapy  Reason for Therapy:  ADL/IADL Dysfunction and Discharge Planning      OCCUPATIONAL THERAPY ASSESSMENT   Patient is a 85 year old female admitted on 5/19/2025  with Presenting Problem: 5/19/25 S/p L anterior LYNNE. Co-Morbidities : H/o CVA, R LYNNE 2021, depression, anxiety, Platinum, HTN, neuropathy  Patient is currently functioning near baseline with self-care and functional mobility.  Prior to admission, patient's baseline is independent level.  Patient met all OT goals at mod I to SBA level.  Patient reports no further questions/concerns at this time. Anticipate patient will reach mod I level with increased activity with nursing team.    Patient will be discharged from OT services at this time.  Will benefit from return to home setting at discharge from hospital.      WEIGHT BEARING RESTRICTION  Weight Bearing Restriction: L lower extremity           L Lower Extremity: Weight Bearing as Tolerated    Recommendations for nursing staff:   Transfers: one person   Toileting location: Toilet    EVALUATION SESSION:  Patient at start of session: supine  FUNCTIONAL TRANSFER ASSESSMENT  Sit to Stand: Chair; Edge of Bed  Edge of Bed: Supervision  Chair: Supervision  Toilet Transfer: Stand-by Assist      BED MOBILITY  Rolling: Modified Independent  Supine to Sit : Modified Independent  Sit to Supine (OT): Modified Independent  Scooting: Mod I      BALANCE ASSESSMENT  Static Sitting: Modified Independent  Static Standing: Supervision      FUNCTIONAL ADL ASSESSMENT  Eating: Independent  Grooming Seated: Modified Independent  LB Dressing Seated: Supervision (set-up)  Toileting Seated: Supervision        ACTIVITY TOLERANCE:   Pulse: 106  Heart Rate Source: Monitor     BP: 132/82  BP Location: Right arm  BP  Method: Automatic  Patient Position: Sitting    O2 SATURATIONS  Oxygen Therapy  SPO2% on Room Air at Rest: 99    COGNITION  Overall Cognitive Status:  WFL - within functional limits  COGNITION ASSESSMENTS       Upper Extremity:   ROM: within functional limits   Strength: is within functional limits   Coordination:  Gross motor: WFL  Fine motor: WFL  Sensation: Light touch:  intact; no c/o numbness or tingling    Vision: no acute changes reported  Able to read clock on wall without difficulty    EDUCATION PROVIDED  Patient Education : Role of Occupational Therapy; Discharge Recommendations; Plan of Care; DME Recommendations; Functional Transfer Techniques; Fall Prevention; Posture/Positioning; Weight Bear Status; Surgical Precautions; Edema Reduction; Energy Conservation; Proper Body Mechanics  Patient's Response to Education: Verbalized Understanding; Returned Demonstration      Therapist comments: OT educated patient on safety,  sequencing, energy conservation, pain management, home modifications and adaptive equipment to increase independence with ADLs.    Patient educated on restrictions, equipment, modified techniques.  Patient able to complete full body dressing without the use of adaptive equipment, after instruction.    Completed bed mobility at mod I level, no c/o.  Discussed positioning for sleep.  Patient verbalized understanding.    Patient End of Session: Up in chair, With  staff, Needs met, Call light within reach, RN aware of session/findings, All patient questions and concerns addressed, Alarm set, Discussed recommendations with /, Hospital anti-slip socks, Ice applied    OCCUPATIONAL PROFILE    HOME SITUATION  Type of Home: House  Home Layout: Multi-level  Lives With: Spouse    Toilet and Equipment: Comfort height toilet  Shower/Tub and Equipment: Walk-in shower, Grab bar, Shower chair          Hand Dominance: Right  Drives: Yes  Patient Regularly Uses: Hearing aides,  Rolling walker, Cane    Prior Level of Function: Mod I with ADLs and functional mobility with use of walker or Rollator.  Patient uses a cane at times when out of the home.  No falls reported in the past 6 months    SUBJECTIVE  PAIN ASSESSMENT  Ratin  Location: denies       OBJECTIVE  Precautions: Bed/chair alarm, Hard of hearing  Fall Risk: High fall risk    WEIGHT BEARING RESTRICTION  Weight Bearing Restriction: L lower extremity           L Lower Extremity: Weight Bearing as Tolerated    AM-PAC ‘6-Clicks’ Inpatient Daily Activity Short Form  -   Putting on and taking off regular lower body clothing?: None  -   Bathing (including washing, rinsing, drying)?: None  -   Toileting, which includes using toilet, bedpan or urinal? : None  -   Putting on and taking off regular upper body clothing?: None  -   Taking care of personal grooming such as brushing teeth?: None  -   Eating meals?: None    AM-PAC Score:  Score: 24  Approx Degree of Impairment: 0%  Standardized Score (AM-PAC Scale): 57.54      ADDITIONAL TESTS     NEUROLOGICAL FINDINGS        PLAN   Patient has been evaluated and presents with no skilled Occupational Therapy needs at this time.  Patient discharged from Occupational Therapy services.  Please re-order if a new functional limitation presents during this admission.      Patient Evaluation Complexity Level:   Occupational Profile/Medical History LOW - Brief history including review of medical or therapy records    Specific performance deficits impacting engagement in ADL/IADL LOW  1 - 3 performance deficits    Client Assessment/Performance Deficits LOW - No comorbidities nor modifications of tasks    Clinical Decision Making LOW - Analysis of occupational profile, problem-focused assessments, limited treatment options    Overall Complexity LOW     OT Session Time: 30 minutes  Self-Care Home Management: 15 minutes  Therapeutic Activities: 15 minutes

## 2025-05-20 NOTE — PLAN OF CARE
A&Ox4, VSS on room air. POD#1, dressing to L hip, C/D/I, gel ice applied, compression sleeve on. Pain is well managed. Ambulating with walker. Tolerating diet. Plan to DC home today [facility]. POC reviewed with patient.

## (undated) DEVICE — SUT STRATAFIX SYMMTRC PDS+ 1 18IN CTX ABSRB V

## (undated) DEVICE — Device

## (undated) DEVICE — HOOD: Brand: FLYTE

## (undated) DEVICE — CEMENT MIXING SYSTEM WITH FEMORAL BREAKWAY NOZZLE: Brand: REVOLUTION

## (undated) DEVICE — PACK CDS TOTAL HIP

## (undated) DEVICE — ENCORE® LATEX MICRO SIZE 8, STERILE LATEX POWDER-FREE SURGICAL GLOVE: Brand: ENCORE

## (undated) DEVICE — DRESSING 10X4IN ANMC SAFETAC

## (undated) DEVICE — SHEET,DRAPE,70X100,STERILE: Brand: MEDLINE

## (undated) DEVICE — CHLORAPREP ORANGE TINT 10.5ML

## (undated) DEVICE — DRAPE SRG 70X60IN SPLT U IMPRV

## (undated) DEVICE — HEWSON SUTURE RETRIEVER: Brand: HEWSON SUTURE RETRIEVER

## (undated) DEVICE — NEPTUNE E-SEP SMOKE EVACUATION PENCIL, COATED, 70MM BLADE, PUSH BUTTON SWITCH: Brand: NEPTUNE E-SEP

## (undated) DEVICE — NEEDLE SPNL 18GA L3.5IN PNK QNCKE STYL DISP

## (undated) DEVICE — STRYKER PERFORMANCE SERIES SAGITTAL BLADE: Brand: STRYKER PERFORMANCE SERIES

## (undated) DEVICE — SYRINGE MED 30ML STD CLR PLAS LL TIP N CTRL

## (undated) DEVICE — DERMABOND LIQUID ADHESIVE

## (undated) DEVICE — HIP DRAPE

## (undated) DEVICE — SOL  .9 1000ML BTL

## (undated) DEVICE — DRAPE,TOWEL,LARGE,INVISISHIELD: Brand: MEDLINE

## (undated) DEVICE — SPLINT ORTH UNV HIP PD CUP LG

## (undated) DEVICE — SUTURE VLOC 90 3-0 9\" 2044

## (undated) DEVICE — SOLUTION IRRIG 3000ML 0.9% NACL FLX CONT

## (undated) DEVICE — BLADE ELECTRODE: Brand: EDGE

## (undated) DEVICE — SUT STRATAFIX SPRL MCRYL+ 2-0 27IN CT-1 ABSRB

## (undated) DEVICE — WRAP HIP COMPR

## (undated) DEVICE — 3M™ IOBAN™ 2 ANTIMICROBIAL INCISE DRAPE 6650EZ: Brand: IOBAN™ 2

## (undated) DEVICE — TOWEL SURG OR 17X30IN BLUE

## (undated) DEVICE — DEV STRATAFIX PDS + SUTR 0 CTX

## (undated) DEVICE — SOL  .9 3000ML

## (undated) DEVICE — ENCORE® LATEX ACCLAIM SIZE 8.5, STERILE LATEX POWDER-FREE SURGICAL GLOVE: Brand: ENCORE

## (undated) DEVICE — GLOVE SUR 6.5 SENSICARE PI PIP GRN PWD F

## (undated) DEVICE — DRAPE SHEET LG

## (undated) DEVICE — GLOVE SUR 6.5 SENSICARE PI PIP CRM PWD F

## (undated) DEVICE — CONTAINER SPEC STR 4OZ GRY LID

## (undated) DEVICE — 1016 S-DRAPE IRRIG POUCH 10/BOX: Brand: STERI-DRAPE™

## (undated) DEVICE — SUTURE MONOCRYL 2-0 Y945H

## (undated) DEVICE — 3M™ STERI-DRAPE™ INSTRUMENT POUCH 1018: Brand: STERI-DRAPE™

## (undated) DEVICE — 1010 S-DRAPE TOWEL DRAPE 10/BX: Brand: STERI-DRAPE™

## (undated) DEVICE — GLOVE SUR 8 SENSICARE PI PIP CRM PWD F

## (undated) DEVICE — HOOD, PEEL-AWAY: Brand: FLYTE

## (undated) DEVICE — GLOVE SUR 7.5 SENSICARE PI PIP GRN PWD F

## (undated) DEVICE — ANTERIOR HIP: Brand: MEDLINE INDUSTRIES, INC.

## (undated) DEVICE — CHLORAPREP 26ML APPLICATOR

## (undated) DEVICE — SUT FBRWR 2 38IN N ABSRB BLU L26.5MM 1/2

## (undated) DEVICE — SUT COAT VCRL 0 27IN CP-1 ABSRB UD 36MM 1/2

## (undated) DEVICE — DRAPE,TOP,102X53,STERILE: Brand: MEDLINE

## (undated) DEVICE — COVER,LIGHT,CAMERA,HARD,1/PK,STRL: Brand: MEDLINE

## (undated) DEVICE — Device: Brand: STABLECUT®

## (undated) DEVICE — ENCORE® LATEX MICRO SIZE 7.5, STERILE LATEX POWDER-FREE SURGICAL GLOVE: Brand: ENCORE

## (undated) DEVICE — 60 ML SYRINGE LUER-LOCK TIP: Brand: MONOJECT

## (undated) DEVICE — GLOVE SUR 7 SENSICARE PI PIP CRM PWD F

## (undated) DEVICE — BIPOLAR SEALER 23-112-1 AQM 6.0: Brand: AQUAMANTYS ®

## (undated) DEVICE — NEEDLE SPINAL 20X3-1/2 YELLOW

## (undated) DEVICE — SUT MCRYL 3-0 27IN ABSRB UD 19MM PS-2 3/8

## (undated) DEVICE — SUTURE ETHIBOND 5-0 MB46G

## (undated) DEVICE — 3M™ STERI-DRAPE™ U-DRAPE 1015: Brand: STERI-DRAPE™

## (undated) NOTE — LETTER
OUTSIDE TESTING RESULT REQUEST     IMPORTANT: FOR YOUR IMMEDIATE ATTENTION  Please FAX all test results listed below to: [phone]       * * * * If testing is NOT complete, arrange with patient A.S.A.P. * * * *      Patient Name: Ruth Youssef  Surgery Date: 2025  Medical Record: [id number]  CSN: [id number]  : 1940 - A: 85 y     Sex: female  Surgeon(s):  Suraj Bailon MD  Procedure: LEFT ANTERIOR TOTAL HIP ARTHROPLASTY  Anesthesia Type: Spinal     Surgeon: Suraj Bailon MD     The following Testing and Time Line are REQUIRED PER ANESTHESIA     EKG READ AND SIGNED WITHIN   90 days      Per patient, had this done in your office today     Thank You,   Sent by:SHARIF Bedolla